# Patient Record
Sex: FEMALE | Race: WHITE | NOT HISPANIC OR LATINO | Employment: OTHER | URBAN - METROPOLITAN AREA
[De-identification: names, ages, dates, MRNs, and addresses within clinical notes are randomized per-mention and may not be internally consistent; named-entity substitution may affect disease eponyms.]

---

## 2018-09-05 ENCOUNTER — HOSPITAL ENCOUNTER (EMERGENCY)
Facility: CLINIC | Age: 73
Discharge: HOME | End: 2018-09-05
Attending: EMERGENCY MEDICINE | Admitting: EMERGENCY MEDICINE
Payer: COMMERCIAL

## 2018-09-05 ENCOUNTER — APPOINTMENT (OUTPATIENT)
Dept: CT IMAGING | Facility: CLINIC | Age: 73
End: 2018-09-05
Attending: PHYSICIAN ASSISTANT
Payer: COMMERCIAL

## 2018-09-05 VITALS
OXYGEN SATURATION: 99 % | DIASTOLIC BLOOD PRESSURE: 62 MMHG | BODY MASS INDEX: 24.79 KG/M2 | HEART RATE: 68 BPM | TEMPERATURE: 98.4 F | RESPIRATION RATE: 20 BRPM | SYSTOLIC BLOOD PRESSURE: 152 MMHG | HEIGHT: 62 IN | WEIGHT: 134.7 LBS

## 2018-09-05 DIAGNOSIS — I71.21 ASCENDING AORTIC ANEURYSM (H): ICD-10-CM

## 2018-09-05 DIAGNOSIS — R16.0 LIVER MASS, RIGHT LOBE: ICD-10-CM

## 2018-09-05 DIAGNOSIS — M54.9 ACUTE UPPER BACK PAIN: ICD-10-CM

## 2018-09-05 LAB
ANION GAP SERPL CALCULATED.3IONS-SCNC: 10 MMOL/L (ref 3–14)
BASOPHILS # BLD AUTO: 0 10E9/L (ref 0–0.2)
BASOPHILS NFR BLD AUTO: 0.2 %
BUN SERPL-MCNC: 15 MG/DL (ref 7–30)
CALCIUM SERPL-MCNC: 8.5 MG/DL (ref 8.5–10.1)
CHLORIDE SERPL-SCNC: 103 MMOL/L (ref 94–109)
CO2 SERPL-SCNC: 26 MMOL/L (ref 20–32)
CREAT SERPL-MCNC: 0.72 MG/DL (ref 0.52–1.04)
DIFFERENTIAL METHOD BLD: ABNORMAL
EOSINOPHIL # BLD AUTO: 0.1 10E9/L (ref 0–0.7)
EOSINOPHIL NFR BLD AUTO: 0.4 %
ERYTHROCYTE [DISTWIDTH] IN BLOOD BY AUTOMATED COUNT: 14.7 % (ref 10–15)
GFR SERPL CREATININE-BSD FRML MDRD: 79 ML/MIN/1.7M2
GLUCOSE SERPL-MCNC: 122 MG/DL (ref 70–99)
HCT VFR BLD AUTO: 39 % (ref 35–47)
HGB BLD-MCNC: 12.4 G/DL (ref 11.7–15.7)
IMM GRANULOCYTES # BLD: 0 10E9/L (ref 0–0.4)
IMM GRANULOCYTES NFR BLD: 0.2 %
INTERPRETATION ECG - MUSE: NORMAL
LYMPHOCYTES # BLD AUTO: 1.3 10E9/L (ref 0.8–5.3)
LYMPHOCYTES NFR BLD AUTO: 11.3 %
MCH RBC QN AUTO: 26.8 PG (ref 26.5–33)
MCHC RBC AUTO-ENTMCNC: 31.8 G/DL (ref 31.5–36.5)
MCV RBC AUTO: 84 FL (ref 78–100)
MONOCYTES # BLD AUTO: 0.6 10E9/L (ref 0–1.3)
MONOCYTES NFR BLD AUTO: 5.2 %
NEUTROPHILS # BLD AUTO: 9.3 10E9/L (ref 1.6–8.3)
NEUTROPHILS NFR BLD AUTO: 82.7 %
NRBC # BLD AUTO: 0 10*3/UL
NRBC BLD AUTO-RTO: 0 /100
PLATELET # BLD AUTO: 203 10E9/L (ref 150–450)
POTASSIUM SERPL-SCNC: 3.9 MMOL/L (ref 3.4–5.3)
RBC # BLD AUTO: 4.62 10E12/L (ref 3.8–5.2)
SODIUM SERPL-SCNC: 139 MMOL/L (ref 133–144)
TROPONIN I SERPL-MCNC: <0.015 UG/L (ref 0–0.04)
WBC # BLD AUTO: 11.3 10E9/L (ref 4–11)

## 2018-09-05 PROCEDURE — 93005 ELECTROCARDIOGRAM TRACING: CPT

## 2018-09-05 PROCEDURE — 96374 THER/PROPH/DIAG INJ IV PUSH: CPT | Mod: 59

## 2018-09-05 PROCEDURE — 71260 CT THORAX DX C+: CPT

## 2018-09-05 PROCEDURE — 84484 ASSAY OF TROPONIN QUANT: CPT | Performed by: PHYSICIAN ASSISTANT

## 2018-09-05 PROCEDURE — 85025 COMPLETE CBC W/AUTO DIFF WBC: CPT | Performed by: PHYSICIAN ASSISTANT

## 2018-09-05 PROCEDURE — 99285 EMERGENCY DEPT VISIT HI MDM: CPT | Mod: 25

## 2018-09-05 PROCEDURE — 25000128 H RX IP 250 OP 636: Performed by: EMERGENCY MEDICINE

## 2018-09-05 PROCEDURE — 25000125 ZZHC RX 250: Performed by: EMERGENCY MEDICINE

## 2018-09-05 PROCEDURE — 80048 BASIC METABOLIC PNL TOTAL CA: CPT | Performed by: PHYSICIAN ASSISTANT

## 2018-09-05 PROCEDURE — 25000128 H RX IP 250 OP 636: Performed by: PHYSICIAN ASSISTANT

## 2018-09-05 RX ORDER — IOPAMIDOL 755 MG/ML
80 INJECTION, SOLUTION INTRAVASCULAR ONCE
Status: COMPLETED | OUTPATIENT
Start: 2018-09-05 | End: 2018-09-05

## 2018-09-05 RX ORDER — HYDROCODONE BITARTRATE AND ACETAMINOPHEN 5; 325 MG/1; MG/1
1 TABLET ORAL EVERY 6 HOURS PRN
Qty: 8 TABLET | Refills: 0 | Status: SHIPPED | OUTPATIENT
Start: 2018-09-05

## 2018-09-05 RX ORDER — HYDROMORPHONE HYDROCHLORIDE 1 MG/ML
0.5 INJECTION, SOLUTION INTRAMUSCULAR; INTRAVENOUS; SUBCUTANEOUS ONCE
Status: COMPLETED | OUTPATIENT
Start: 2018-09-05 | End: 2018-09-05

## 2018-09-05 RX ORDER — HYDROMORPHONE HYDROCHLORIDE 1 MG/ML
0.5 INJECTION, SOLUTION INTRAMUSCULAR; INTRAVENOUS; SUBCUTANEOUS
Status: DISCONTINUED | OUTPATIENT
Start: 2018-09-05 | End: 2018-09-05

## 2018-09-05 RX ADMIN — SODIUM CHLORIDE, PRESERVATIVE FREE 70 ML: 5 INJECTION INTRAVENOUS at 21:08

## 2018-09-05 RX ADMIN — Medication 0.5 MG: at 20:20

## 2018-09-05 RX ADMIN — IOPAMIDOL 80 ML: 755 INJECTION, SOLUTION INTRAVENOUS at 21:08

## 2018-09-05 ASSESSMENT — ENCOUNTER SYMPTOMS
BACK PAIN: 1
COUGH: 1
NAUSEA: 0
VOMITING: 0
SHORTNESS OF BREATH: 1
LIGHT-HEADEDNESS: 1
DIAPHORESIS: 1

## 2018-09-05 NOTE — ED AVS SNAPSHOT
Emergency Department    [address] [facility] [address]    Phone:  [phone]    Fax:  [phone]                                       Jessica Rawls   MRN: [id number]    Department:   Emergency Department   Date of Visit:  9/5/2018           After Visit Summary Signature Page     I have received my discharge instructions, and my questions have been answered. I have discussed any challenges I see with this plan with the nurse or doctor.    ..........................................................................................................................................  Patient/Patient Representative Signature      ..........................................................................................................................................  Patient Representative Print Name and Relationship to Patient    ..................................................               ................................................  Date                                            Time    ..........................................................................................................................................  Reviewed by Signature/Title    ...................................................              ..............................................  Date                                                            Time          22EPIC Rev 08/18

## 2018-09-05 NOTE — ED AVS SNAPSHOT
Emergency Department    [address] [facility] [address]    Phone:  [phone]    Fax:  [phone]                                       Jessica Rawls   MRN: [id number]    Department:   Emergency Department   Date of Visit:  9/5/2018           Patient Information     Date Of Birth          1945        Your diagnoses for this visit were:     Acute upper back pain     Liver mass, right lobe     Ascending aortic aneurysm (H)        You were seen by Chayo Navarro MD.      Follow-up Information     Follow up with  Emergency Department.    Specialty:  EMERGENCY MEDICINE    Why:  As needed, If symptoms worsen    Contact information:    [address] [facility] [address] [phone]        Follow up with Marilin Hobson MD In 1 day.    Specialty:  Internal Medicine    Contact information:    ETHAN HERRING  [address] [facility] [address] [facility] [address]  [facility] [address] [phone]          Discharge Instructions       Follow-up with your primary care provider regarding the incidental mass seen in your liver as further imaging may be required to exclude the possibility of cancerous mass.  Discharge Instructions  Back Pain  You were seen today for back pain. Back pain can have many causes, but most will get better without surgery or other specific treatment. Sometimes there is a herniated ( slipped ) disc. We do not usually do MRI scans to look for these right away, since most herniated discs will get better on their own with time.  Today, we did not find any evidence that your back pain was caused by a serious condition. However, sometimes symptoms develop over time and cannot be found during an emergency visit, so it is very important that you follow up with your primary provider.  Generally, every Emergency Department visit should have a follow-up clinic visit with either a primary or a specialty clinic/provider. Please follow-up as instructed by your emergency provider  today.    Return to the Emergency Department if:    You develop a fever with your back pain.     You have weakness or change in sensation in one or both legs.    You lose control of your bowels or bladder, or cannot empty your bladder (cannot pee).    Your pain gets much worse.     Follow-up with your provider:    Unless your pain has completely gone away, please make an appointment with your provider within one week. Most of the routine care for back pain is available in a clinic and not the Emergency Department. You may need further management of your back pain, such as more pain medication, imaging such as an X-ray or MRI, or physical therapy.    What can I do to help myself?    Remain Active -- People are often afraid that they will hurt their back further or delay recovery by remaining active, but this is one of the best things you can do for your back. In fact, staying in bed for a long time to rest is not recommended. Studies have shown that people with low back pain recover faster when they remain active. Movement helps to bring blood flow to the muscles and relieve muscle spasms as well as preventing loss of muscle strength.    Heat -- Using a heating pad can help with low back pain during the first few weeks. Do not sleep with a heating pad, as you can be burned.     Pain medications - You may take a pain medication such as Tylenol  (acetaminophen), Advil , Motrin  (ibuprofen) or Aleve  (naproxen).  If you were given a prescription for medicine here today, be sure to read all of the information (including the package insert) that comes with your prescription.  This will include important information about the medicine, its side effects, and any warnings that you need to know about.  The pharmacist who fills the prescription can provide more information and answer questions you may have about the medicine.  If you have questions or concerns that the pharmacist cannot address, please call or return to the  Emergency Department.   Remember that you can always come back to the Emergency Department if you are not able to see your regular provider in the amount of time listed above, if you get any new symptoms, or if there is anything that worries you.    24 Hour Appointment Hotline       To make an appointment at any [facility], call [phone] ([phone]). If you don't have a family doctor or clinic, we will help you find one. [facility] are conveniently located to serve the needs of you and your family.             Review of your medicines      CONTINUE these medicines which may have CHANGED, or have new prescriptions. If we are uncertain of the size of tablets/capsules you have at home, strength may be listed as something that might have changed.        Dose / Directions Last dose taken    HYDROcodone-acetaminophen 5-325 MG per tablet   Commonly known as:  NORCO   Dose:  1 tablet   What changed:    - how much to take  - when to take this  - reasons to take this   Quantity:  8 tablet        Take 1 tablet by mouth every 6 hours as needed for severe pain   Refills:  0          Our records show that you are taking the medicines listed below. If these are incorrect, please call your family doctor or clinic.        Dose / Directions Last dose taken    AMLODIPINE BESYLATE PO   Dose:  2.5 mg        Take 2.5 mg by mouth daily.   Refills:  0        butalbital-APAP-caffeine-codeine -88-30 MG per capsule   Commonly known as:  FIORICET WITH codeine   Dose:  1 capsule        Take 1 capsule by mouth every 4 hours as needed.   Refills:  0        LORAZEPAM PO   Dose:  0.5 mg        Take 0.5 mg by mouth as needed.   Refills:  0        TEMAZEPAM PO   Dose:  15 mg        Take 15 mg by mouth nightly as needed.   Refills:  0                Information about OPIOIDS     PRESCRIPTION OPIOIDS: WHAT YOU NEED TO KNOW   We gave you an opioid (narcotic) pain medicine. It is important to manage your pain, but opioids are  not always the best choice. You should first try all the other options your care team gave you. Take this medicine for as short a time (and as few doses) as possible.    Some activities can increase your pain, such as bandage changes or therapy sessions. It may help to take your pain medicine 30 to 60 minutes before these activities. Reduce your stress by getting enough sleep, working on hobbies you enjoy and practicing relaxation or meditation. Talk to your care team about ways to manage your pain beyond prescription opioids.    These medicines have risks:    DO NOT drive when on new or higher doses of pain medicine. These medicines can affect your alertness and reaction times, and you could be arrested for driving under the influence (DUI). If you need to use opioids long-term, talk to your care team about driving.    DO NOT operate heavy machinery    DO NOT do any other dangerous activities while taking these medicines.    DO NOT drink any alcohol while taking these medicines.     If the opioid prescribed includes acetaminophen, DO NOT take with any other medicines that contain acetaminophen. Read all labels carefully. Look for the word  acetaminophen  or  Tylenol.  Ask your pharmacist if you have questions or are unsure.    You can get addicted to pain medicines, especially if you have a history of addiction (chemical, alcohol or substance dependence). Talk to your care team about ways to reduce this risk.    All opioids tend to cause constipation. Drink plenty of water and eat foods that have a lot of fiber, such as fruits, vegetables, prune juice, apple juice and high-fiber cereal. Take a laxative (Miralax, milk of magnesia, Colace, Senna) if you don t move your bowels at least every other day. Other side effects include upset stomach, sleepiness, dizziness, throwing up, tolerance (needing more of the medicine to have the same effect), physical dependence and slowed breathing.    Store your pills in a secure  place, locked if possible. We will not replace any lost or stolen medicine. If you don t finish your medicine, please throw away (dispose) as directed by your pharmacist. The [place] [employer] has more information about safe disposal: https://www.pca.state.[place]/living-green/managing-unwanted-medications        Prescriptions were sent or printed at these locations (1 Prescription)                   Other Prescriptions                Printed at Department/Unit printer (1 of 1)         HYDROcodone-acetaminophen (NORCO) 5-325 MG per tablet                Procedures and tests performed during your visit     Basic metabolic panel    CBC with platelets differential    CT Aortic Survey w Contrast    EKG 12 lead    Troponin I      Orders Needing Specimen Collection     None      Pending Results     No orders found from 9/3/2018 to 9/6/2018.            Pending Culture Results     No orders found from 9/3/2018 to 9/6/2018.            Pending Results Instructions     If you had any lab results that were not finalized at the time of your Discharge, you can call the ED Lab Result RN at [phone]. You will be contacted by this team for any positive Lab results or changes in treatment. The nurses are available 7 days a week from 10A to 6:30P.  You can leave a message 24 hours per day and they will return your call.        Test Results From Your Hospital Stay        9/5/2018  8:30 PM      Component Results     Component Value Ref Range & Units Status    WBC 11.3 (H) 4.0 - 11.0 10e9/L Final    RBC Count 4.62 3.8 - 5.2 10e12/L Final    Hemoglobin 12.4 11.7 - 15.7 g/dL Final    Hematocrit 39.0 35.0 - 47.0 % Final    MCV 84 78 - 100 fl Final    MCH 26.8 26.5 - 33.0 pg Final    MCHC 31.8 31.5 - 36.5 g/dL Final    RDW 14.7 10.0 - 15.0 % Final    Platelet Count 203 150 - 450 10e9/L Final    Diff Method Automated Method  Final    % Neutrophils 82.7 % Final    % Lymphocytes 11.3 % Final    % Monocytes 5.2 % Final    %  Eosinophils 0.4 % Final    % Basophils 0.2 % Final    % Immature Granulocytes 0.2 % Final    Nucleated RBCs 0 0 /100 Final    Absolute Neutrophil 9.3 (H) 1.6 - 8.3 10e9/L Final    Absolute Lymphocytes 1.3 0.8 - 5.3 10e9/L Final    Absolute Monocytes 0.6 0.0 - 1.3 10e9/L Final    Absolute Eosinophils 0.1 0.0 - 0.7 10e9/L Final    Absolute Basophils 0.0 0.0 - 0.2 10e9/L Final    Abs Immature Granulocytes 0.0 0 - 0.4 10e9/L Final    Absolute Nucleated RBC 0.0  Final         9/5/2018  8:43 PM      Component Results     Component Value Ref Range & Units Status    Sodium 139 133 - 144 mmol/L Final    Potassium 3.9 3.4 - 5.3 mmol/L Final    Chloride 103 94 - 109 mmol/L Final    Carbon Dioxide 26 20 - 32 mmol/L Final    Anion Gap 10 3 - 14 mmol/L Final    Glucose 122 (H) 70 - 99 mg/dL Final    Urea Nitrogen 15 7 - 30 mg/dL Final    Creatinine 0.72 0.52 - 1.04 mg/dL Final    GFR Estimate 79 >60 mL/min/1.7m2 Final    Non  GFR Calc    GFR Estimate If Black >90 >60 mL/min/1.7m2 Final    African American GFR Calc    Calcium 8.5 8.5 - 10.1 mg/dL Final         9/5/2018  8:48 PM      Component Results     Component Value Ref Range & Units Status    Troponin I ES <0.015 0.000 - 0.045 ug/L Final    The 99th percentile for upper reference range is 0.045 ug/L.  Troponin values   in the range of 0.045 - 0.120 ug/L may be associated with risks of adverse   clinical events.           9/5/2018  9:28 PM      Narrative     CT AORTIC SURVEY WITH CONTRAST   9/5/2018 9:09 PM      HISTORY: Sudden onset thoracic back pain, history of prior ascending  aortic aneurysm repair. Evaluate for pulmonary embolus as well.     TECHNIQUE: CT chest, abdomen and pelvis with intravenous contrast.  Radiation dose for this scan was reduced using automated exposure  control, adjustment of the mA and/or kV according to patient size, or  iterative reconstruction technique. 80mL [id number].       COMPARISON:  None.    FINDINGS:  Chest: There is  aneurysmal dilatation of the proximal aortic arch  measuring 4.6 cm. The distal aortic arch is mildly aneurysmal  measuring 3.6 cm. No aortic dissection. There are postoperative  changes in the mediastinum. The heart is at the upper limits normal in  size. Evaluation of the pulmonary arterial system shows no evidence of  embolus. There is dependent atelectasis bilaterally. Mild fibrosis at  the periphery of the lungs and lung bases. No pneumothorax or pleural  effusion.    Abdomen: There is atherosclerotic disease throughout the aorta and its  branches. No aortic aneurysm or dissection. There is a mass in the  posterior right lobe of the liver laterally measuring 2.6 cm and  demonstrating peripheral hypervascular enhancement. The spleen,  gallbladder, pancreas, adrenal glands and kidneys are normal in  appearance. There is no abdominal or pelvic lymph node enlargement.    Pelvis: There are colonic diverticula without acute diverticulitis. No  bowel obstruction or inflammation. The appendix is normal. The uterus  and adnexa appear normal. No free intraperitoneal gas or fluid.        Impression     IMPRESSION:  1. There is aneurysmal dilatation of the aortic arch. No aortic  dissection.  2. 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  3. 2.6 cm indeterminant right lobe liver lesion.  4. Colonic diverticula without acute diverticulitis. No bowel  obstruction or inflammation.    KYLIE JARA MD                Clinical Quality Measure: Blood Pressure Screening     Your blood pressure was checked while you were in the emergency department today. The last reading we obtained was  BP: 159/60 . Please read the guidelines below about what these numbers mean and what you should do about them.  If your systolic blood pressure (the top number) is less than 120 and your diastolic blood pressure (the bottom number) is less than 80, then your blood pressure is normal. There is nothing more that you need  "to do about it.  If your systolic blood pressure (the top number) is 120-139 or your diastolic blood pressure (the bottom number) is 80-89, your blood pressure may be higher than it should be. You should have your blood pressure rechecked within a year by a primary care provider.  If your systolic blood pressure (the top number) is 140 or greater or your diastolic blood pressure (the bottom number) is 90 or greater, you may have high blood pressure. High blood pressure is treatable, but if left untreated over time it can put you at risk for heart attack, stroke, or kidney failure. You should have your blood pressure rechecked by a primary care provider within the next 4 weeks.  If your provider in the emergency department today gave you specific instructions to follow-up with your doctor or provider even sooner than that, you should follow that instruction and not wait for up to 4 weeks for your follow-up visit.        Thank you for choosing [facility]       Thank you for choosing [facility] for your care. Our goal is always to provide you with excellent care. Hearing back from our patients is one way we can continue to improve our services. Please take a few minutes to complete the written survey that you may receive in the mail after you visit with us. Thank you!        [employer] Information     [employer] lets you send messages to your doctor, view your test results, renew your prescriptions, schedule appointments and more. To sign up, go to [email or web address] . Click on \"Log in\" on the left side of the screen, which will take you to the Welcome page. Then click on \"Sign up Now\" on the right side of the page.     You will be asked to enter the access code listed below, as well as some personal information. Please follow the directions to create your username and password.     Your access code is: [email or web address]  Expires: 2018  9:40 PM     Your access code will  in 90 days. If you need help or a new code, " please call your [facility] clinic or [phone].        Care EveryWhere ID     This is your Care EveryWhere ID. This could be used by other organizations to access your [facility] medical records  [id number]        Equal Access to Services     JOSUE COLÓN : Rowdy Huitron, waaxda luqadaha, qaybta kaalmada radha, torrey bang. So [facility] [phone].    ATENCIÓN: Si habla español, tiene a brady disposición servicios gratuitos de asistencia lingüística. Llame al [phone].    We comply with applicable federal civil rights laws and [place] laws. We do not discriminate on the basis of race, color, national origin, age, disability, sex, sexual orientation, or gender identity.            After Visit Summary       This is your record. Keep this with you and show to your community pharmacist(s) and doctor(s) at your next visit.

## 2018-09-06 NOTE — DISCHARGE INSTRUCTIONS
Follow-up with your primary care provider regarding the incidental mass seen in your liver as further imaging may be required to exclude the possibility of cancerous mass.  Discharge Instructions  Back Pain  You were seen today for back pain. Back pain can have many causes, but most will get better without surgery or other specific treatment. Sometimes there is a herniated ( slipped ) disc. We do not usually do MRI scans to look for these right away, since most herniated discs will get better on their own with time.  Today, we did not find any evidence that your back pain was caused by a serious condition. However, sometimes symptoms develop over time and cannot be found during an emergency visit, so it is very important that you follow up with your primary provider.  Generally, every Emergency Department visit should have a follow-up clinic visit with either a primary or a specialty clinic/provider. Please follow-up as instructed by your emergency provider today.    Return to the Emergency Department if:    You develop a fever with your back pain.     You have weakness or change in sensation in one or both legs.    You lose control of your bowels or bladder, or cannot empty your bladder (cannot pee).    Your pain gets much worse.     Follow-up with your provider:    Unless your pain has completely gone away, please make an appointment with your provider within one week. Most of the routine care for back pain is available in a clinic and not the Emergency Department. You may need further management of your back pain, such as more pain medication, imaging such as an X-ray or MRI, or physical therapy.    What can I do to help myself?    Remain Active -- People are often afraid that they will hurt their back further or delay recovery by remaining active, but this is one of the best things you can do for your back. In fact, staying in bed for a long time to rest is not recommended. Studies have shown that people with  low back pain recover faster when they remain active. Movement helps to bring blood flow to the muscles and relieve muscle spasms as well as preventing loss of muscle strength.    Heat -- Using a heating pad can help with low back pain during the first few weeks. Do not sleep with a heating pad, as you can be burned.     Pain medications - You may take a pain medication such as Tylenol  (acetaminophen), Advil , Motrin  (ibuprofen) or Aleve  (naproxen).  If you were given a prescription for medicine here today, be sure to read all of the information (including the package insert) that comes with your prescription.  This will include important information about the medicine, its side effects, and any warnings that you need to know about.  The pharmacist who fills the prescription can provide more information and answer questions you may have about the medicine.  If you have questions or concerns that the pharmacist cannot address, please call or return to the Emergency Department.   Remember that you can always come back to the Emergency Department if you are not able to see your regular provider in the amount of time listed above, if you get any new symptoms, or if there is anything that worries you.

## 2018-09-06 NOTE — ED PROVIDER NOTES
History     Chief Complaint:  Back pain    HPI   Jessica Rawls is a 73 year old female status-post ascending aortic aneurysm repair 5-6 years ago, with history of hypertension, who presents to the emergency department today with her  (an MD) for evaluation of upper back pain that began suddenly this evening around 17:30. The patient reports being on her feet in the kitchen for about one hour prior to onset of her upper back pain. She localized the pain between her shoulder blades and rated the pain 9/10 in severity. She felt diaphoretic initially, then had to sit down when she felt lightheaded and a little short of breath. Her pain began to improve slightly, but then she felt worse pain in her lower back and still some pain in her upper back. The patient states she has never had a pain like this before and this feels different from when she has had muscle spasms in the past. She did take aspirin at home and then presented to the ED for further evaluation. Additionally, the patient reports she has had a cold and cough of late, but she states this is resolving. The patient denies chest pain or abdominal pain. No nausea/vomiting. She denies leg swelling or pain.  No known history of coronary artery disease, diabetes, hyperlipidemia, tobacco use.    PE/DVT Risk Factors:   Hx of PE/DVT:   -  Hx of clotting disorder:  -  Hx of cancer:    -  Tobacco use:    -  Hormone therapy:   -  Pregnancy:    -  Prolonged immobilization:  -  Recent surgery:   -  Recent travel:    -  Familial Hx of PE/DVT:  -    Allergies:  No Known Drug Allergies      Medications:    Amlodipine   Fioricet   Lorazepam   Temazepam   Metoprolol     Past Medical History:    Hypertension   Lipoma     Past Surgical History:       Carpal tunnel release bilaterally   Lipoma removal   AAA replacement    Family History:    History reviewed. No pertinent family history.      Social History:  The patient was accompanied to the ED by , who is  "bashir MARIA.  Smoking Status: never  Smokeless Tobacco: never  Alcohol Use: no   Marital Status:   [2]     Review of Systems   Constitutional: Positive for diaphoresis.   Respiratory: Positive for cough and shortness of breath.    Cardiovascular: Negative for chest pain and leg swelling.   Gastrointestinal: Negative for nausea and vomiting.   Musculoskeletal: Positive for back pain.   Neurological: Positive for light-headedness.   All other systems reviewed and are negative.    Physical Exam     Patient Vitals for the past 24 hrs:   BP Temp Temp src Pulse Resp SpO2 Height Weight   09/05/18 2150 152/62 - - 68 20 99 % - -   09/05/18 1936 159/60 98.4  F (36.9  C) Oral 69 16 99 % 1.575 m (5' 2\") 61.1 kg (134 lb 11.2 oz)      Physical Exam  General: Alert and cooperative with exam. Normal mentation.  Head:  Scalp is NC/AT  Eyes:  No scleral icterus, PERRL   ENT:  The external nose and ears are normal.   CV:  Regular rate and rhythm    No pathologic murmur, rubs, or gallops.  Resp:  Breath sounds are clear bilaterally.  No crackles, wheezes, rhonchi.    Non-labored, no retractions or accessory muscle use  GI:  Abdomen is soft, no distension, no tenderness. No peritoneal signs.  :  No suprapubic or flank tenderness  MS:  No lower extremity edema or asymmetric calf swelling.    No midline cervical, thoracic, or lumbar tenderness.  No tenderness to palpation over muscles of back or ribs.  Skin:  Warm and dry, No rash or lesions noted.  Neuro: Oriented x 3. No gross motor deficits.      Emergency Department Course     ECG:  ECG taken at 1932, ECG read at 1935  Sinus bradycardia  Septal infarct, age undetermined   Abnormal ECG   Rate 56 bpm. LA interval 184. QRS duration 84. QT/QTc 432/416. P-R-T axes 78,23,79.     Imaging:  Radiology findings were communicated with the patient who voiced understanding of the findings.    CT Aortic Survey w Contrast  1. There is aneurysmal dilatation of the aortic arch. No " aortic  dissection.  2. 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  3. 2.6 cm indeterminant right lobe liver lesion.  4. Colonic diverticula without acute diverticulitis. No bowel  obstruction or inflammation.  KYLIE JARA MD    Laboratory:  Laboratory findings were communicated with the patient who voiced understanding of the findings.    Troponin (Collected 1932): <0.015    CBC: WBC 11.3 (H), HGB 12.4,   BMP: Glucose 122 (H) o/w WNL (Creatinine 0.72)    Interventions:  2020 Dilaudid, 0.5 mg, IV      Emergency Department Course:  Nursing notes and vitals reviewed.  I entered the room.  I performed an exam of the patient as documented above.     EKG obtained in the ED, see results above.      IV was inserted and blood was drawn for laboratory testing, results above.    The patient was sent for CT Scan while in the emergency department, results above.     The patient received the above intervention(s).     the patient was rechecked and updated regarding the results of the laboratory and imaging studies.  Pain was resolved on recheck after pain medication.    I discussed the treatment plan with the patient. They expressed understanding of this plan and consented to discharge. They will be discharged home with instructions for care and follow up. In addition, the patient will return to the emergency department if their symptoms worsen, if new symptoms arise or if there is any concern.  All questions were answered.     Impression & Plan      Medical Decision Making:  Jessica Rawls is a 73 year old female who presents to the emergency department today for evaluation of sudden onset of upper back pain.  Patient history and records reviewed.  On examination, the patient is vitally stable and mildly uncomfortable appearing.  EKG obtained as above which demonstrated no evidence of ischemia or arrhythmia.  Lab work including troponin was negative.  Given concern for aortic dissection in  light of patient's symptoms and prior history of thoracic aortic aneurysm CT of the chest and abdomen with contrast was performed which showed no signs of acute dissection, rupture, pulmonary embolism, pneumonia, pneumothorax, perforated ulcer or other cause of patient's symptoms.    I suspect the patient's symptoms of back pain are most likely the result of a muscle spasms given her negative CT scan here.  I was initially most concerned about the possibility of aortic dissection or rupture AAA given her description of sudden onset back pain of maximal intensity and her prior history of thoracic aneurysm.  Fortunately this was negative, but did reveal 4.6 cm ascending aneurysm which the patient had repair of 5 years prior.  That study was not available for review to compare diameter, but I did recommend the patient follow-up with her primary care provider regarding this result.  Additionally, the CT scan did reveal the presence of a indeterminant liver mass, which I also discussed with the patient and recommended that she follow-up with primary care for additional imaging to exclude possibility of malignancy.  Do not suspect acute coronary syndrome as the patient denies chest pain or shortness of breath, and EKG and initial troponin negative.  She has no abdominal tenderness on exam to suggest an intra-abdominal process.  Patient was given symptomatic treatment as above with complete relief of pain.  The patient was given a prescription for a short course of pain medication, and instructed to follow-up with her primary care provider in the next 1-2 days if symptoms persisting.  Discussed indications to return to the ED if any new or worsening symptoms.        Diagnosis:    ICD-10-CM    1. Acute upper back pain M54.9    2. Liver mass, right lobe R16.0    3. Ascending aortic aneurysm (H) I71.2      Disposition:   The patient was discharged to home.    Scribe Disclosure:  Fabiano KENNY, am serving as a scribe at  7:50 PM on 9/5/2018 to document services personally performed by Meek Garcia PA-C, based on my observations and the provider's statements to me.    EMERGENCY DEPARTMENT       Meek Garcia PA-C  09/05/18 0426

## 2022-07-16 ENCOUNTER — HOSPITAL ENCOUNTER (EMERGENCY)
Facility: CLINIC | Age: 77
Discharge: HOME | End: 2022-07-16
Attending: EMERGENCY MEDICINE | Admitting: EMERGENCY MEDICINE
Payer: COMMERCIAL

## 2022-07-16 ENCOUNTER — APPOINTMENT (OUTPATIENT)
Dept: CT IMAGING | Facility: CLINIC | Age: 77
End: 2022-07-16
Attending: EMERGENCY MEDICINE
Payer: COMMERCIAL

## 2022-07-16 VITALS
WEIGHT: 128 LBS | HEART RATE: 61 BPM | SYSTOLIC BLOOD PRESSURE: 128 MMHG | RESPIRATION RATE: 7 BRPM | DIASTOLIC BLOOD PRESSURE: 71 MMHG | TEMPERATURE: 98.1 F | HEIGHT: 61 IN | BODY MASS INDEX: 24.17 KG/M2 | OXYGEN SATURATION: 96 %

## 2022-07-16 DIAGNOSIS — R07.9 CHEST PAIN, UNSPECIFIED TYPE: ICD-10-CM

## 2022-07-16 LAB
ALBUMIN SERPL-MCNC: 3.7 G/DL (ref 3.4–5)
ALP SERPL-CCNC: 74 U/L (ref 40–150)
ALT SERPL W P-5'-P-CCNC: 19 U/L (ref 0–50)
ANION GAP SERPL CALCULATED.3IONS-SCNC: 6 MMOL/L (ref 3–14)
AST SERPL W P-5'-P-CCNC: 17 U/L (ref 0–45)
ATRIAL RATE - MUSE: 58 BPM
BASOPHILS # BLD AUTO: 0 10E3/UL (ref 0–0.2)
BASOPHILS NFR BLD AUTO: 0 %
BILIRUB SERPL-MCNC: 0.3 MG/DL (ref 0.2–1.3)
BUN SERPL-MCNC: 23 MG/DL (ref 7–30)
CALCIUM SERPL-MCNC: 9.3 MG/DL (ref 8.5–10.1)
CHLORIDE BLD-SCNC: 105 MMOL/L (ref 94–109)
CO2 SERPL-SCNC: 27 MMOL/L (ref 20–32)
CREAT BLD-MCNC: 0.8 MG/DL (ref 0.5–1)
CREAT SERPL-MCNC: 0.71 MG/DL (ref 0.52–1.04)
D DIMER PPP FEU-MCNC: 1.02 UG/ML FEU (ref 0–0.5)
DIASTOLIC BLOOD PRESSURE - MUSE: NORMAL MMHG
EOSINOPHIL # BLD AUTO: 0.1 10E3/UL (ref 0–0.7)
EOSINOPHIL NFR BLD AUTO: 2 %
ERYTHROCYTE [DISTWIDTH] IN BLOOD BY AUTOMATED COUNT: 14.4 % (ref 10–15)
GFR SERPL CREATININE-BSD FRML MDRD: 86 ML/MIN/1.73M2
GFR SERPL CREATININE-BSD FRML MDRD: >60 ML/MIN/1.73M2
GLUCOSE BLD-MCNC: 151 MG/DL (ref 70–99)
HCT VFR BLD AUTO: 42.7 % (ref 35–47)
HGB BLD-MCNC: 13 G/DL (ref 11.7–15.7)
HOLD SPECIMEN: NORMAL
IMM GRANULOCYTES # BLD: 0 10E3/UL
IMM GRANULOCYTES NFR BLD: 0 %
INR PPP: 1 (ref 0.85–1.15)
INTERPRETATION ECG - MUSE: NORMAL
LYMPHOCYTES # BLD AUTO: 2 10E3/UL (ref 0.8–5.3)
LYMPHOCYTES NFR BLD AUTO: 24 %
MCH RBC QN AUTO: 26.5 PG (ref 26.5–33)
MCHC RBC AUTO-ENTMCNC: 30.4 G/DL (ref 31.5–36.5)
MCV RBC AUTO: 87 FL (ref 78–100)
MONOCYTES # BLD AUTO: 0.6 10E3/UL (ref 0–1.3)
MONOCYTES NFR BLD AUTO: 7 %
NEUTROPHILS # BLD AUTO: 5.6 10E3/UL (ref 1.6–8.3)
NEUTROPHILS NFR BLD AUTO: 67 %
NRBC # BLD AUTO: 0 10E3/UL
NRBC BLD AUTO-RTO: 0 /100
P AXIS - MUSE: 43 DEGREES
PLATELET # BLD AUTO: 209 10E3/UL (ref 150–450)
POTASSIUM BLD-SCNC: 4.2 MMOL/L (ref 3.4–5.3)
PR INTERVAL - MUSE: 172 MS
PROT SERPL-MCNC: 7.5 G/DL (ref 6.8–8.8)
QRS DURATION - MUSE: 84 MS
QT - MUSE: 414 MS
QTC - MUSE: 406 MS
R AXIS - MUSE: 2 DEGREES
RBC # BLD AUTO: 4.9 10E6/UL (ref 3.8–5.2)
SODIUM SERPL-SCNC: 138 MMOL/L (ref 133–144)
SYSTOLIC BLOOD PRESSURE - MUSE: NORMAL MMHG
T AXIS - MUSE: 50 DEGREES
TROPONIN I SERPL HS-MCNC: 5 NG/L
TROPONIN I SERPL HS-MCNC: 7 NG/L
VENTRICULAR RATE- MUSE: 58 BPM
WBC # BLD AUTO: 8.3 10E3/UL (ref 4–11)

## 2022-07-16 PROCEDURE — 85025 COMPLETE CBC W/AUTO DIFF WBC: CPT | Performed by: EMERGENCY MEDICINE

## 2022-07-16 PROCEDURE — 84484 ASSAY OF TROPONIN QUANT: CPT | Performed by: EMERGENCY MEDICINE

## 2022-07-16 PROCEDURE — 250N000013 HC RX MED GY IP 250 OP 250 PS 637: Performed by: EMERGENCY MEDICINE

## 2022-07-16 PROCEDURE — 36415 COLL VENOUS BLD VENIPUNCTURE: CPT | Performed by: EMERGENCY MEDICINE

## 2022-07-16 PROCEDURE — 250N000009 HC RX 250: Performed by: EMERGENCY MEDICINE

## 2022-07-16 PROCEDURE — 85610 PROTHROMBIN TIME: CPT | Performed by: EMERGENCY MEDICINE

## 2022-07-16 PROCEDURE — 74177 CT ABD & PELVIS W/CONTRAST: CPT

## 2022-07-16 PROCEDURE — 93005 ELECTROCARDIOGRAM TRACING: CPT

## 2022-07-16 PROCEDURE — 82565 ASSAY OF CREATININE: CPT

## 2022-07-16 PROCEDURE — 250N000011 HC RX IP 250 OP 636: Performed by: EMERGENCY MEDICINE

## 2022-07-16 PROCEDURE — 80053 COMPREHEN METABOLIC PANEL: CPT | Performed by: EMERGENCY MEDICINE

## 2022-07-16 PROCEDURE — 85379 FIBRIN DEGRADATION QUANT: CPT | Performed by: EMERGENCY MEDICINE

## 2022-07-16 PROCEDURE — 82040 ASSAY OF SERUM ALBUMIN: CPT | Performed by: EMERGENCY MEDICINE

## 2022-07-16 PROCEDURE — 99285 EMERGENCY DEPT VISIT HI MDM: CPT | Mod: 25

## 2022-07-16 RX ORDER — NITROGLYCERIN 0.4 MG/1
0.4 TABLET SUBLINGUAL EVERY 5 MIN PRN
Qty: 25 TABLET | Refills: 0 | Status: SHIPPED | OUTPATIENT
Start: 2022-07-16

## 2022-07-16 RX ORDER — ACETAMINOPHEN 325 MG/1
650 TABLET ORAL ONCE
Status: COMPLETED | OUTPATIENT
Start: 2022-07-16 | End: 2022-07-16

## 2022-07-16 RX ORDER — IOPAMIDOL 755 MG/ML
80 INJECTION, SOLUTION INTRAVASCULAR ONCE
Status: COMPLETED | OUTPATIENT
Start: 2022-07-16 | End: 2022-07-16

## 2022-07-16 RX ORDER — NITROGLYCERIN 0.4 MG/1
0.4 TABLET SUBLINGUAL EVERY 5 MIN PRN
Status: DISCONTINUED | OUTPATIENT
Start: 2022-07-16 | End: 2022-07-17 | Stop reason: HOSPADM

## 2022-07-16 RX ADMIN — NITROGLYCERIN 0.4 MG: 0.4 TABLET SUBLINGUAL at 22:11

## 2022-07-16 RX ADMIN — IOPAMIDOL 80 ML: 755 INJECTION, SOLUTION INTRAVENOUS at 20:37

## 2022-07-16 RX ADMIN — SODIUM CHLORIDE 80 ML: 900 INJECTION INTRAVENOUS at 20:37

## 2022-07-16 RX ADMIN — ACETAMINOPHEN 650 MG: 325 TABLET ORAL at 22:11

## 2022-07-17 NOTE — ED TRIAGE NOTES
Upper back pain under shoulder blades and chest pain since 6 pm. Hx of aortic dissection and was told closely monitor for chest pain.     Triage Assessment     Row Name 07/16/22 1930       Triage Assessment (Adult)    Airway WDL WDL       Respiratory WDL    Respiratory WDL WDL          Skin Circulation/Temperature WDL    Skin Circulation/Temperature WDL WDL       Cardiac WDL    Cardiac WDL chest pain;X       Peripheral/Neurovascular WDL    Peripheral Neurovascular WDL WDL       Cognitive/Neuro/Behavioral WDL    Cognitive/Neuro/Behavioral WDL WDL

## 2023-02-27 NOTE — ED PROVIDER NOTES
Care Due:                  Date            Visit Type   Department     Provider  --------------------------------------------------------------------------------                                ESTABLISHED                              PATIENT -    [facility]  Last Visit: 10-      [facility]      MEDICINE       Anneliese Estevez  Next Visit: None Scheduled  None         None Found                                                            Last  Test          Frequency    Reason                     Performed    Due Date  --------------------------------------------------------------------------------    Lipid Panel.  12 months..  cholestyramine,..........  02- 02-    [facility] Embedded Care Gaps. Reference number: [id number]. 2/27/2023   6:02:49 AM CST   Visit Date: 07/16/2022    CHIEF COMPLAINT:  Chest pain.    HISTORY OF PRESENT ILLNESS:  This is a 77-year-old woman who presents to the Emergency Department with chest pain.  She was eating when she first noted pain in her back over the upper spine between her scapula.  She then noticed a little pain in the front as well.  She did not have short of breath, though when she would take a breath, she would notice it a little bit more.  No sweats, no nausea, no vomiting.  She has no history of coronary disease, but has had an ascending aortic aneurysm repaired in the past and was told to watch out for some pain.  She denies fevers or chills or productive cough.  She has no history of coronary disease or PE.  No leg swelling or recent travel.    PAST MEDICAL HISTORY:  As noted above.  She does have high blood pressure, and takes amlodipine and Tenormin.    FAMILY AND SOCIAL HISTORY:  .  Does not smoke or drink.    REVIEW OF SYSTEMS:  As noted with all other systems being negative.      ALLERGIES:  LISINOPRIL, CAUSING COUGH.    PHYSICAL EXAMINATION:    GENERAL:  Reveals an elderly white female, sitting.  No respiratory distress.  VITAL SIGNS:  Blood pressure 165/73.  Temperature 98.  Pulse 54.  Respirations 16.  Pulse ox 97% on room air.  HEENT:  Pupils equal, reactive.  Extraocular movements intact.  Nares and oropharynx are clear.  NECK:  Neck veins flat.  LUNGS:  Clear.  HEART:  Heart sounds are regular.  No murmurs appreciated.  Sternotomy scar is present.  ABDOMEN:  Soft, no tenderness or masses.  Peripheral pulses 2+ radial and bilateral lower extremities.  MUSCULOSKELETAL:  No swelling or tenderness.  SKIN:  No rash.  NEUROLOGIC:  Awake, alert, appropriate.  Fluent speech.  Normal motor sensation and coordination.  PSYCHIATRIC:  Normal affect.    EMERGENCY DEPARTMENT COURSE:  The patient was placed on EKG, blood pressure, and oximetry monitoring.  EKG revealed a sinus rhythm with possible left atrial  abnormality and nonspecific ST-T changes, primarily T-wave inversion extends to lead V2 and V3.      Lab work:  D-dimer minimally elevated at 1.02.  INR 1.  White count 8.3, hemoglobin 13, platelet count is 209.  Glucose 151.  Initial troponin was normal and second troponin was 7, again Normal.      The patient had taken an aspirin on her own.  We did give her Tylenol and we gave her nitroglycerin with one nitroglycerin spray her pain went completely away.  With ekg abnormality and pain relief with ntg, I recommended she be admitted for further chest pain evaluation.  Patient understood but said she would not be admitted,, she agreed to take asa daily, ntg if pain returned, and have an outpatient stress test this week   If pain returns return to ED    Impression:  Chest pain    Rell Horton MD        D: 2022   T: 2022   MT: [id number]    Name:     HAYLIE SOSA  MRN:      [id number]        Account:    [id number]   :      1945           Visit Date: 2022     Document: [id number]

## 2024-12-16 ENCOUNTER — TELEPHONE (OUTPATIENT)
Dept: CARDIOLOGY | Facility: CLINIC | Age: 79
End: 2024-12-16

## 2024-12-16 NOTE — TELEPHONE ENCOUNTER
Patient called to see if she knew she had an appt today and she stated that no one notified her of this appointment.  RN apologized and rescheduled her for 12/23/24 at 2:30 pm.  Directions to the clinic provided.

## 2024-12-23 ENCOUNTER — TELEPHONE (OUTPATIENT)
Dept: CARDIOLOGY | Facility: CLINIC | Age: 79
End: 2024-12-23

## 2024-12-23 NOTE — TELEPHONE ENCOUNTER
LVM for pt regarding appt today, needing to r/s due to provider being in procedure/surgery.   
pt overal low risk for suicide attempt, no si/hi, no hx attempt, supportive family, risk factors include agitation

## 2025-01-06 ENCOUNTER — DOCUMENTATION ONLY (OUTPATIENT)
Dept: CARDIOLOGY | Facility: CLINIC | Age: 80
End: 2025-01-06

## 2025-01-06 ENCOUNTER — OFFICE VISIT (OUTPATIENT)
Dept: CARDIOLOGY | Facility: CLINIC | Age: 80
End: 2025-01-06
Payer: COMMERCIAL

## 2025-01-06 VITALS
HEIGHT: 61 IN | SYSTOLIC BLOOD PRESSURE: 134 MMHG | DIASTOLIC BLOOD PRESSURE: 78 MMHG | HEART RATE: 83 BPM | BODY MASS INDEX: 24.17 KG/M2 | WEIGHT: 128 LBS | OXYGEN SATURATION: 96 %

## 2025-01-06 DIAGNOSIS — I71.23 ANEURYSM OF DESCENDING THORACIC AORTA WITHOUT RUPTURE: ICD-10-CM

## 2025-01-06 DIAGNOSIS — I71.21 ANEURYSM OF ASCENDING AORTA WITHOUT RUPTURE: ICD-10-CM

## 2025-01-06 PROCEDURE — 99204 OFFICE O/P NEW MOD 45 MIN: CPT | Performed by: SURGERY

## 2025-01-06 RX ORDER — AMLODIPINE BESYLATE 5 MG/1
5 TABLET ORAL DAILY
COMMUNITY
Start: 2024-10-28 | End: 2025-01-09

## 2025-01-06 RX ORDER — ACETAMINOPHEN 500 MG
500 TABLET ORAL EVERY 6 HOURS PRN
COMMUNITY

## 2025-01-06 RX ORDER — ESTRADIOL 0.1 MG/G
CREAM VAGINAL DAILY
COMMUNITY
Start: 2024-06-14

## 2025-01-06 NOTE — PROGRESS NOTES
Met with patient and  in consultation with Dr. Randall   Pre-op labs and imaging reviewed and discussed with patient/family.  Pre-op education done.  Will schedule right and left heart cath and pre-op labs once patient decides she is for sure going through with surgery.  Possible re-do sternotomy aortic arch repair with frozen elephant trunk and thoraflex graft in near future.  Contact information reviewed and questions addressed.

## 2025-01-07 ENCOUNTER — HOSPITAL ENCOUNTER (OUTPATIENT)
Facility: CLINIC | Age: 80
End: 2025-01-07
Attending: INTERNAL MEDICINE | Admitting: INTERNAL MEDICINE
Payer: COMMERCIAL

## 2025-01-07 DIAGNOSIS — I71.23 ANEURYSM OF DESCENDING THORACIC AORTA WITHOUT RUPTURE: Primary | ICD-10-CM

## 2025-01-07 DIAGNOSIS — I71.23 ANEURYSM OF DESCENDING THORACIC AORTA WITHOUT RUPTURE: ICD-10-CM

## 2025-01-07 NOTE — PROGRESS NOTES
[facility]  Cardiovascular and Thoracic Surgery Clinic Consultation    Jessica Rawls MRN# [id number]   YOB: 1945 Age: 79 year old        Reason for consult: 2nd surgical opinion for complex redo aortic surgery           Assessment and Plan:   Ms. Rawls is a very pleasant otherwise healthy and quite active 78 yo F who looks younger than her stated age. She underwent an ascending aorta replacement in 2013 and now has significant aneurysmal disease involving the distal ascending aorta, aortic arch, and the proximal descending aorta as well. This complex aneurysmal diease would require a redo operation involving circulatory arrest with an hybrid open/endograft (Thoraflex) to replace her aortic arch with a frozen elephant trunk with likely extension with a TEVAR to exclude the descending thoracic aorta. I do think that despite her age and previous surgery, she would be a surgical candidate, and that would be my recommendation.    I explained to the patient the diagnosis in detail with the CTA findings. I went over the risks and benefits of the operation, as well as the possible complications associated with the surgery. These complications include, but are not strictly limited to, infection, bleeding, stroke, paralysis, postop MI, postop arrhythmias, pulmonary and renal complications. I think she is an overall reasonable surgical candidate, and I quoted an operative mortality risk of 5%. She would like to think about her options and let us know when she decides what she would like to do. If she decides to proceed, we will get a LHC/RHC and also have her see Dr. Rene Crouch, who I will reach out to later today.       It was a pleasure to meet Ms. Rawls today.     Attestation:  Ernie Randall MD           Chief Complaint:   Denies any chest pain or shortness of breath.           History of Present Illness:   This patient is a 79 year old female who presents with distal  ascending aorta, aortic arch, and proximal descending thoracic aortic aneurysm for a second surgical opinion. She underwent an ascending aorta replacement in  by Dr. Cristino Ramos at [facility] with a  32mm Hemashield interposition graft. Her most recent CTA from last August demonstrated a 5.0 cm distal ascending, 4.9 cm arch, and a 6.0 cm proximal descending aortic aneurysms. GUICHO in July was unremarkable. She denies any symptoms. She was seen by Dr. Donnie Yung as well.                 Past Medical History:     Past Medical History:   Diagnosis Date    Headaches     Hypertension              Past Surgical History:     Past Surgical History:   Procedure Laterality Date     SECTION      ENDOSCOPIC RELEASE CARPAL TUNNEL  2012    Procedure: ENDOSCOPIC RELEASE CARPAL TUNNEL;  LEFT ENDOSCOPIC CARPAL TUNNEL RELEASE;  Surgeon: Chitra James MD;  Location: [facility]    ENDOSCOPIC RELEASE CARPAL TUNNEL  2012    Procedure: ENDOSCOPIC RELEASE CARPAL TUNNEL;  RIGHT ENDOSCOPIC CARPAL TUNNEL RELEASE;  Surgeon: Chitra James MD;  Location: [facility]    SOFT TISSUE SURGERY      lipoma removal               Social History:     Social History     Tobacco Use    Smoking status: Never    Smokeless tobacco: Never   Substance Use Topics    Alcohol use: No             Family History:   History reviewed. No pertinent family history.          Immunizations:     Immunization History   Administered Date(s) Administered    COVID-19 12+ (MODERNA) 10/28/2023, 2024    COVID-19 12+ (Pfizer) 2024    COVID-19 Bivalent 12+ (Pfizer) 2022, 05/10/2023    COVID-19 MONOVALENT 12+ (Pfizer) 2021, 2021, 10/08/2021    COVID-19 Monovalent 12+ (Pfizer ) 2022             Allergies:     Allergies   Allergen Reactions    Alendronate Muscle Pain (Myalgia)     Side effect NOT an allergy    Lisinopril Cough     Classic side effect NOT an allergy    Zolpidem Other  (See Comments)     Not effective, NOT an allergy             Medications:     Current Outpatient Medications   Medication Sig Dispense Refill    acetaminophen (TYLENOL) 500 MG tablet Take 500 mg by mouth every 6 hours as needed for mild pain.      amLODIPine (NORVASC) 5 MG tablet Take 5 mg by mouth daily.      AMLODIPINE BESYLATE PO Take 2.5 mg by mouth daily.        Calcium 200 MG TABS Take 3 tablets by mouth daily.      estradiol (ESTRACE) 0.1 MG/GM vaginal cream Place vaginally daily.      LORAZEPAM PO Take 0.5 mg by mouth as needed.        Multiple Vitamins-Minerals (MULTIVITAMIN ADULTS 50+ PO) Take 1 tablet by mouth daily.       No current facility-administered medications for this visit.             Review of Systems:   The 10 point Review of Systems is negative other than noted in the HPI           Physical Exam:   Vitals were reviewed                     General: pleasant, in no acute distress  CV: RRR, normal S1 and S2, no M/G/R  Resp: CTAB  Abd: soft, ND  Ext: no edema   Neuro: no focal deficits             Data:     Lab Results   Component Value Date    WBC 8.3 2022    HGB 13.0 2022    HCT 42.7 2022     2022     2022    POTASSIUM 4.2 2022    CHLORIDE 105 2022    CO2 27 2022    BUN 23 2022    CR 0.8 2022     (H) 2022    DD 1.02 (H) 2022    TROPI <0.015 2018    AST 17 2022    ALT 19 2022    ALKPHOS 74 2022    BILITOTAL 0.3 2022    INR 1.00 2022       TTE 24:  ECHOCARDIOGRAM       HAYLIE SOSA   MRN:    [id number]        Accession#:   [id number]   :    1945 79 years Study Date:   2024 1:04:52 PM   Gender: F                 BP:           0/0 mmHg   Height: 154.94 cm         BSA:          1.55 mÂ    Weight: 57.15 kg          Tech:         SKS                             Referring MD: GO HANCOCK   Site[facility]             [facility]   Reading Location: [facility]    Patient Location: Outpatient.       Procedure: 2D, Color Doppler and Spectral Doppler.     Indication for study: Heart murmur   Cardiac Rhythm: Normal sinus.Study quality: Good.     Final Impressions:    1. Normal left ventricular size, normal wall thickness, normal global systolic function, calculated EF of 61 %.    2. Right ventricular cavity size is normal, global systolic RV function is not well visualized.    3. Trileaflet aortic valve with dilated aortic root (4.3 cm) and evidence of prior ascending aorta repair.    4. Dilated ascending aorta beyond the graft, measuring 5.4 cm.    5. Consider cross-sectional imaging (CTA or MRA) to further characterize thoracic aorta dimensions.     Chamber Sizes and Function   Normal left ventricular size, normal wall thickness, normal global systolic function, calculated EF of 61 %. Left atrial size is normal. Right ventricular cavity size is normal, global systolic RV function is not well visualized. The right atrium is normal. The pulmonary artery is of normal size and origin. The sinus of Valsalva is dilated. The ascending aorta is dilated.     Valves, RV Pressures and Diastolic Function     The aortic valve is trileaflet, no stenosis and trivial regurgitation. The mitral valve is normal in structure, trace mitral regurgitation. Indeterminate pattern of LV diastolic filling. The tricuspid valve is normal in structure. Tricuspid regurgitation is trace regurgitation. The tricuspid regurgitant velocity is 2.1 m/s, the estimated right ventricular systolic pressure is 17 mmHg plus right atrial pressure. The pulmonic valve is not well visualized. Trace pulmonary regurgitation.     Masses, Effusion, Shunts   There is no pericardial effusion. The inferior vena cava is normal sized, respiratory size variation greater than 50%. No left to right shunting was detected by limited color flow Doppler interrogation of the interatrial septum.     MEASUREMENTS AND CALCULATIONS     2-D  Measurements and LV Function:     LVID (d) 4.0 cm Planimetered EF 61 %   LVID (s) 2.5 cm LV FS% (2D)     39 %   IVS (d)  0.8 cm LVOT diameter   2.0 cm   LVPW (d) 0.7 cm HR              68 bpm   Ao Sinus 4.3 cm LA Vol index    30 ml/m2   Asc Ao   5.4 cm RV Max 4C (d)   3.4 cm     Diastology:   Mitral         Tissue Doppler   E Peak 0.4 m/s  e', Septum     0.06 m/s   A Peak 0.6 m/s  e', Lateral    0.08 m/s   E/A    0.8      E/e' Average   6.34   DT     141 msec     Aortic Valve:     Vmax       1.2 m/s  TERRY (V)   2.61 cmÂ    VTI        0.25 m   TERRY (I)   2.57 cmÂ    LVOT V max 1.0 m/s  Max PG    5 mmHg   LVOT VTI   0.20 m   Mean PG   3 mmHg   SV         64 ml    Dim Index 0.82   SV index   41 ml/mÂ  CO        4.4 l/min                       CI        2.8 l/min/mÂ      Mitral Valve:     MVA      5.4 cmÂ    MV P 1/2 41 msec     Tricuspid Valve and estimated PA pressures:   TR Vmax 2.1 m/s TAPSE 1.1 cm   TR maxG 17 mmHg         Electronically signed by Abdiaziz Clement MD on 7/24/2024 4:13:09 PM.           Chest CTA 8/6/24:  CHEST -- Computed Tomography     Impression    1. No acute nonvascular findings in the chest.  2. Please refer to separately dictated report for evaluation of  cardiovascular structures.    Please note that all CT scans at this facility use dose modulation,  iterative reconstruction, and/or weight-based dosing when appropriate to  reduce radiation dose to as low as reasonably achievable.    Dictated by Bassam Pacheco MD @ 8/6/2024 12:41:58 PM  Signed by: Bassam Pacheco MD @8/6/2024 12:41:58 PM  (Electronic Signature)  Narrative    STUDY: CHEST CT ANGIOGRAPHY    Study date: 8/6/2024    Indication: 79 year-old female with history of surgical repair of the  ascending aorta has been referred for evaluation of thoracic and abdominal  aorta morphology.    STUDY PARAMETERS:  Scanner: Siemens Definition Drive  Contrast: 88 ml of Omnipaque 350  Scan protocol: Gated high-pitch for noncontrast phase. Gated  high-pitch  for arterial phase. Gated high-pitch for delayed phase.  Radiation dose length product: 225.6      FINAL IMPRESSIONS:  Intact ascending aorta surgical graft with maximum diameters 33 x 35 mm  Dilated ascending aorta past the surgical graft with maximum diameters 50  x 44 mm, cross-sectional area 17.77 cm2, and area to heigh ratio 11.46  cm2/m.  Dilated aortic arch with maximum diameters 40 x 49 mm.  Dilated descending aorta with layered thrombus. The proximal descending  aorta maximum diameter measures 55 x 60 mm  Moderate calcification of the coronary arteries.  Please see separate radiology report for nonvascular findings.      FINDINGS:    Thoracic aorta: Left-sided arch. Mild atheromatous disease in the  ascending aorta.  Aortic sinus: 37 x 37 x 34 mm maximum [id number].  Ascending aorta: The ascending aorta was surgically repaired (32 mm  straight Hemashield graft) and the maximum diameter measures 33 x 35 mm.  The ascending is dilated past the surgical graft with maximum diameter  measures 50 x 44 mm, cross-sectional area of 17.77 cm2, and area to heigh  ratio 11.46 cm2/m.  Arch: The maximum diameter measures 40 x 49 mm. There is a narrow segment  of the aortic arch just distal to left subclavian artery with maximum  diameters measuring 37 x 31 mm.  Descending thoracic aorta: the proximal descending aorta 55 x 60 mm, with  layered mural thrombus.    Abdominal aorta: maximum diameter of the visualized segment 26 x 26 mm.    Left atrium: Normal contrast opacification.    Aortic valve: tri-leaflet.    Coronary arteries: Moderate calcification noted in the coronary arteries.    Pericardium: No effusion    Great arteries: Normal great artery branching pattern.    Brachiocephalic artery: Patent.    Visualized right subclavian artery: Patent.    Visualized right common carotid artery: Patent.    Visualized left common carotid artery: Patent.    Visualized left subclavian artery: Patent.      FOR  PATIENT: Results are automatically released to your [employer]  [employer] account once available, in compliance with federal regulations.    This means that you may see your results before your provider has had a  chance to review them.  Please allow 2-3 business days for your provider  to comment on the results.      Dion Singh MD  Advanced cardiovascular imaging fellow    Edgar Hutson MD, [facility]  Cardiologist  [facility]  Office [phone]

## 2025-01-09 ENCOUNTER — OFFICE VISIT (OUTPATIENT)
Dept: CARDIOLOGY | Facility: CLINIC | Age: 80
End: 2025-01-09
Payer: COMMERCIAL

## 2025-01-09 ENCOUNTER — TELEPHONE (OUTPATIENT)
Dept: CARDIOLOGY | Facility: CLINIC | Age: 80
End: 2025-01-09

## 2025-01-09 VITALS
WEIGHT: 128.4 LBS | BODY MASS INDEX: 24.24 KG/M2 | SYSTOLIC BLOOD PRESSURE: 148 MMHG | HEART RATE: 77 BPM | DIASTOLIC BLOOD PRESSURE: 76 MMHG | HEIGHT: 61 IN

## 2025-01-09 DIAGNOSIS — I10 PRIMARY HYPERTENSION: Primary | ICD-10-CM

## 2025-01-09 DIAGNOSIS — I10 PRIMARY HYPERTENSION: ICD-10-CM

## 2025-01-09 DIAGNOSIS — I71.23 ANEURYSM OF DESCENDING THORACIC AORTA WITHOUT RUPTURE: ICD-10-CM

## 2025-01-09 RX ORDER — AMLODIPINE BESYLATE 5 MG/1
5 TABLET ORAL 2 TIMES DAILY
Qty: 60 TABLET | Refills: 11 | Status: SHIPPED | OUTPATIENT
Start: 2025-01-09

## 2025-01-09 RX ORDER — AMLODIPINE BESYLATE 5 MG/1
5 TABLET ORAL 2 TIMES DAILY
Qty: 60 TABLET | Refills: 11 | Status: SHIPPED | OUTPATIENT
Start: 2025-01-09 | End: 2025-01-09

## 2025-01-09 RX ORDER — ROSUVASTATIN CALCIUM 5 MG/1
1 TABLET, COATED ORAL AT BEDTIME
COMMUNITY
Start: 2024-06-14

## 2025-01-09 NOTE — PROGRESS NOTES
HPI and Plan:   Jessica Rawls is a 79 year old female who presents with for a second opinion related to possible complex redo aortic surgery.  She is a fairly healthy 79-year-old female who underwent ascending aortic replacement in 2013 due to severe aortic aneurysm.  In routine imaging in 2018, she was noted to have aneurysmal dilatation of the proximal aortic arch around 4.6 cm, there is no comment on the descending aorta although the abdominal aorta is read as no aneurysm or dissection.  Again in 2022 she had repeat chest CT which showed ascending thoracic aorta measuring 4.8 cm, atherosclerosis and intraluminal thrombus at the level of the aortic arch and descending thoracic aorta is read as unchanged.  Lastly repeat chest CT in August 2024, the ascending aortic surgical graft is intact and normal size, there is dilatation of the ascending aorta past the surgical graft now measuring 5.0 cm in diameter the aortic arch is dilated maximal diameter 4.9, descending aorta shows layered thrombus in the proximal descending aorta maximal diameter of 5.5 x 6.0 cm.  She initially saw a cardiothoracic surgeon at [facility] Dr. Daniel Zapien who wanted to look at the images himself from the previous chest CTs before making a recommendation according to his note.  The follow-up recommendation is not documented however the patient states that he preferred waiting 6 months and repeating a chest CT rather than pursuing surgery at this time.  She then saw Dr. Randall, cardiothoracic surgeon at [place] [facility] who recommended redo operation with circulatory arrest and a hybrid open graft replacement of the aortic arch with frozen elephant trunk with likely extension with a TEVAR to exclude the descending aorta.  She is having no active symptoms today.  She is mildly hypertensive today with a blood pressure 148/76  We did perform an electrocardiogram in office which demonstrated normal sinus rhythm without acute ST or T  wave abnormality    Summary    1.  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  She has had progressive aneurysmal disease of the distal ascending aorta, aortic arch and descending aorta.  The maximal diameter in the descending aorta is around 6.0 cm.  She has sought the opinion of 2 cardiothoracic surgeons, 1 recommending conservative therapy with repeat chest CT in 3 to 6 months, the other recommending complex thoracic aortic surgery.  Not only does the size of the maximal diameter of the aorta matter in regards to risk of rupture but also its progression over time.  I understand why the first thoracic surgeon wanted to evaluate the images himself prior to making decision as the dictation of the previous chest CTs, it is not clear exactly how much the distal ascending aorta, aortic arch and descending aorta have expanded over the last 6 years.  Given the complexity of this surgical repair, I would strongly recommend surgery in a facility with low operative risk and significant previous experience with this type of complex and extensive aortic surgery.  I am recommending referral to the [facility] at this time.    2.  Hypertension-aggressive control as needed given her complex aortic disease.  She states her blood pressures have been mildly elevated since dealing with this whole issue.  I would recommend increasing her amlodipine for the time being.  She is currently taking 5 mg in the a.m. and 2 point 5 in the PM.  I would recommend taking 5 mg twice daily and I did change her prescription to reflect this.    Please feel free to contact me with any questions given regards to her care  Today's clinic visit entailed:  Review of external notes as documented elsewhere in note  Review of the result(s) of each unique test - chest CT, Cs from 2018,2022  Prescription drug management  Total tme spent in chart review, history, exam, education, clinical  recommendations and counseling 40 min  Provider  Link to MDM Help Grid     The level of medical decision making during this visit was of moderate complexity.    Orders Placed This Encounter   Procedures    EKG 12-lead complete w/read - Clinics (performed today)     Orders Placed This Encounter   Medications    rosuvastatin (CRESTOR) 5 MG tablet     Sig: Take 1 tablet by mouth at bedtime.    amLODIPine (NORVASC) 5 MG tablet     Sig: Take 1 tablet (5 mg) by mouth 2 times daily. Together with 2.5 mg for a total of 7.5 mg once daily     Dispense:  60 tablet     Refill:  11     Medications Discontinued During This Encounter   Medication Reason    amLODIPine (NORVASC) 5 MG tablet Reorder (No AVS)         Encounter Diagnoses   Name Primary?    Aneurysm of descending thoracic aorta without rupture     Primary hypertension Yes       CURRENT MEDICATIONS:  Current Outpatient Medications   Medication Sig Dispense Refill    acetaminophen (TYLENOL) 500 MG tablet Take 500 mg by mouth every 6 hours as needed for mild pain.      amLODIPine (NORVASC) 5 MG tablet Take 1 tablet (5 mg) by mouth 2 times daily. Together with 2.5 mg for a total of 7.5 mg once daily 60 tablet 11    AMLODIPINE BESYLATE PO Take 2.5 mg by mouth daily.        Calcium 200 MG TABS Take 3 tablets by mouth daily.      estradiol (ESTRACE) 0.1 MG/GM vaginal cream Place vaginally daily.      LORAZEPAM PO Take 0.5 mg by mouth as needed.        Multiple Vitamins-Minerals (MULTIVITAMIN ADULTS 50+ PO) Take 1 tablet by mouth daily.      rosuvastatin (CRESTOR) 5 MG tablet Take 1 tablet by mouth at bedtime.         ALLERGIES     Allergies   Allergen Reactions    Alendronate Muscle Pain (Myalgia)     Side effect NOT an allergy    Lisinopril Cough     Classic side effect NOT an allergy    Zolpidem Other (See Comments)     Not effective, NOT an allergy       PAST MEDICAL HISTORY:  Past Medical History:   Diagnosis Date    Headaches     Hypertension        PAST SURGICAL  HISTORY:  Past Surgical History:   Procedure Laterality Date     SECTION      ENDOSCOPIC RELEASE CARPAL TUNNEL  2012    Procedure: ENDOSCOPIC RELEASE CARPAL TUNNEL;  LEFT ENDOSCOPIC CARPAL TUNNEL RELEASE;  Surgeon: Chitra James MD;  Location: [facility]    ENDOSCOPIC RELEASE CARPAL TUNNEL  2012    Procedure: ENDOSCOPIC RELEASE CARPAL TUNNEL;  RIGHT ENDOSCOPIC CARPAL TUNNEL RELEASE;  Surgeon: Chitra James MD;  Location: [facility]    SOFT TISSUE SURGERY      lipoma removal       FAMILY HISTORY:  Family History   Problem Relation Age of Onset    Heart Failure Mother        SOCIAL HISTORY:  Social History     Socioeconomic History    Marital status:      Spouse name: None    Number of children: None    Years of education: None    Highest education level: None   Tobacco Use    Smoking status: Never    Smokeless tobacco: Never   Substance and Sexual Activity    Alcohol use: No    Drug use: No     Social Drivers of Health     Financial Resource Strain: Low Risk  (2024)    Received from [facility]    Financial Resource Strain     Difficulty of Paying Living Expenses: 3   Food Insecurity: No Food Insecurity (2024)    Received from [facility]    Food Insecurity     Do you worry your food will run out before you are able to buy more?: 1   Transportation Needs: No Transportation Needs (2024)    Received from [facility]    Transportation Needs     Does lack of transportation keep you from medical appointments?: 1     Does lack of transportation keep you from work, meetings or getting things that you need?: 1   Social Connections: Socially Integrated (2024)    Received from [facility]    Social Connections     Do you often feel lonely or isolated from those around you?: 0   Housing Stability: Low Risk  (2024)    Received from  [facility]    Housing Stability     What is your housing situation today?: 1       Review of Systems:  Skin:        Eyes:       ENT:       Respiratory:  Negative    Cardiovascular:  Negative, palpitations, chest pain, syncope or near-syncope, cyanosis, lightheadedness, dizziness, fatigue, exercise intolerance, edema Positive for  Gastroenterology:      Genitourinary:       Musculoskeletal:       Neurologic:       Psychiatric:       Heme/Lymph/Imm:       Endocrine:         Physical Exam:  Vitals: BP (!) 148/76   Pulse 77   Ht 1.549 m (5' 1\")   Wt 58.2 kg (128 lb 6.4 oz)   BMI 24.26 kg/m      Constitutional:  cooperative, in no acute distress        Skin:  warm and dry to the touch          Head:  normocephalic        Eyes:  pupils equal and round        Lymph:      ENT:  no pallor or cyanosis        Neck:  no carotid bruit        Respiratory:  clear to auscultation         Cardiac: regular rhythm                                                         GI:  abdomen soft        Extremities and Muscular Skeletal:  no edema              Neurological:  affect appropriate, no gross motor deficits        Psych:  Alert and Oriented x 3        Recent Lab Results:  LIPID RESULTS:  No results found for: \"CHOL\", \"HDL\", \"LDL\", \"TRIG\", \"CHOLHDLRATIO\"    LIVER ENZYME RESULTS:  Lab Results   Component Value Date    AST 17 07/16/2022    ALT 19 07/16/2022       CBC RESULTS:  Lab Results   Component Value Date    WBC 8.3 07/16/2022    WBC 11.3 (H) 09/05/2018    RBC 4.90 07/16/2022    RBC 4.62 09/05/2018    HGB 13.0 07/16/2022    HGB 12.4 09/05/2018    HCT 42.7 07/16/2022    HCT 39.0 09/05/2018    MCV 87 07/16/2022    MCV 84 09/05/2018    MCH 26.5 07/16/2022    MCH 26.8 09/05/2018    MCHC 30.4 (L) 07/16/2022    MCHC 31.8 09/05/2018    RDW 14.4 07/16/2022    RDW 14.7 09/05/2018     07/16/2022     09/05/2018       BMP RESULTS:  Lab Results   Component Value Date     07/16/2022    " " 09/05/2018    POTASSIUM 4.2 07/16/2022    POTASSIUM 3.9 09/05/2018    CHLORIDE 105 07/16/2022    CHLORIDE 103 09/05/2018    CO2 27 07/16/2022    CO2 26 09/05/2018    ANIONGAP 6 07/16/2022    ANIONGAP 10 09/05/2018     (H) 07/16/2022     (H) 09/05/2018    BUN 23 07/16/2022    BUN 15 09/05/2018    CR 0.8 07/16/2022    CR 0.71 07/16/2022    CR 0.72 09/05/2018    GFRESTIMATED >60 07/16/2022    GFRESTIMATED 86 07/16/2022    GFRESTIMATED 79 09/05/2018    GFRESTBLACK >90 09/05/2018    MANUEL 9.3 07/16/2022    MANUEL 8.5 09/05/2018        A1C RESULTS:  No results found for: \"A1C\"    INR RESULTS:  Lab Results   Component Value Date    INR 1.00 07/16/2022           CC  Ernie Randall MD  [id number]  [place] [id number]                "

## 2025-01-09 NOTE — TELEPHONE ENCOUNTER
Per OV note:    2.  Hypertension-aggressive control as needed given her complex aortic disease.  She states her blood pressures have been mildly elevated since dealing with this whole issue.  I would recommend increasing her amlodipine for the time being.  She is currently taking 5 mg in the a.m. and 2 point 5 in the PM.  I would recommend taking 5 mg twice daily and I did change her prescription to reflect this.       RN did update prescription  Estela Gordon RN on 1/9/2025 at 12:54 PM

## 2025-01-09 NOTE — LETTER
1/9/2025    Marilin Hobson MD  [address]  [facility] [address]    RE: Jessica Rawls       Dear Colleague,     I had the pleasure of seeing Jessica Rawls in the [facility].  HPI and Plan:   Jessica Rawls is a 79 year old female who presents with for a second opinion related to possible complex redo aortic surgery.  She is a fairly healthy 79-year-old female who underwent ascending aortic replacement in 2013 due to severe aortic aneurysm.  In routine imaging in 2018, she was noted to have aneurysmal dilatation of the proximal aortic arch around 4.6 cm, there is no comment on the descending aorta although the abdominal aorta is read as no aneurysm or dissection.  Again in 2022 she had repeat chest CT which showed ascending thoracic aorta measuring 4.8 cm, atherosclerosis and intraluminal thrombus at the level of the aortic arch and descending thoracic aorta is read as unchanged.  Lastly repeat chest CT in August 2024, the ascending aortic surgical graft is intact and normal size, there is dilatation of the ascending aorta past the surgical graft now measuring 5.0 cm in diameter the aortic arch is dilated maximal diameter 4.9, descending aorta shows layered thrombus in the proximal descending aorta maximal diameter of 5.5 x 6.0 cm.  She initially saw a cardiothoracic surgeon at [facility] Dr. Daniel Zapien who wanted to look at the images himself from the previous chest CTs before making a recommendation according to his note.  The follow-up recommendation is not documented however the patient states that he preferred waiting 6 months and repeating a chest CT rather than pursuing surgery at this time.  She then saw Dr. Randall, cardiothoracic surgeon at [place] who recommended redo operation with circulatory arrest and a hybrid open graft replacement of the aortic arch with frozen elephant trunk with likely extension with a TEVAR to exclude the descending aorta.  She is  having no active symptoms today.  She is mildly hypertensive today with a blood pressure 148/76  We did perform an electrocardiogram in office which demonstrated normal sinus rhythm without acute ST or T wave abnormality    Summary    1.  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  She has had progressive aneurysmal disease of the distal ascending aorta, aortic arch and descending aorta.  The maximal diameter in the descending aorta is around 6.0 cm.  She has sought the opinion of 2 cardiothoracic surgeons, 1 recommending conservative therapy with repeat chest CT in 3 to 6 months, the other recommending complex thoracic aortic surgery.  Not only does the size of the maximal diameter of the aorta matter in regards to risk of rupture but also its progression over time.  I understand why the first thoracic surgeon wanted to evaluate the images himself prior to making decision as the dictation of the previous chest CTs, it is not clear exactly how much the distal ascending aorta, aortic arch and descending aorta have expanded over the last 6 years.  Given the complexity of this surgical repair, I would strongly recommend surgery in a facility with low operative risk and significant previous experience with this type of complex and extensive aortic surgery.  I am recommending referral to the [facility] at this time.    2.  Hypertension-aggressive control as needed given her complex aortic disease.  She states her blood pressures have been mildly elevated since dealing with this whole issue.  I would recommend increasing her amlodipine for the time being.  She is currently taking 5 mg in the a.m. and 2 point 5 in the PM.  I would recommend taking 5 mg twice daily and I did change her prescription to reflect this.    Please feel free to contact me with any questions given regards to her care  Today's clinic visit entailed:  Review of external notes as documented  elsewhere in note  Review of the result(s) of each unique test - chest CT, Cs from 2018,2022  Prescription drug management  Total tme spent in chart review, history, exam, education, clinical recommendations and counseling 40 min  Provider  Link to MDM Help Grid     The level of medical decision making during this visit was of moderate complexity.    Orders Placed This Encounter   Procedures     EKG 12-lead complete w/read - Clinics (performed today)     Orders Placed This Encounter   Medications     rosuvastatin (CRESTOR) 5 MG tablet     Sig: Take 1 tablet by mouth at bedtime.     amLODIPine (NORVASC) 5 MG tablet     Sig: Take 1 tablet (5 mg) by mouth 2 times daily. Together with 2.5 mg for a total of 7.5 mg once daily     Dispense:  60 tablet     Refill:  11     Medications Discontinued During This Encounter   Medication Reason     amLODIPine (NORVASC) 5 MG tablet Reorder (No AVS)         Encounter Diagnoses   Name Primary?     Aneurysm of descending thoracic aorta without rupture      Primary hypertension Yes       CURRENT MEDICATIONS:  Current Outpatient Medications   Medication Sig Dispense Refill     acetaminophen (TYLENOL) 500 MG tablet Take 500 mg by mouth every 6 hours as needed for mild pain.       amLODIPine (NORVASC) 5 MG tablet Take 1 tablet (5 mg) by mouth 2 times daily. Together with 2.5 mg for a total of 7.5 mg once daily 60 tablet 11     AMLODIPINE BESYLATE PO Take 2.5 mg by mouth daily.         Calcium 200 MG TABS Take 3 tablets by mouth daily.       estradiol (ESTRACE) 0.1 MG/GM vaginal cream Place vaginally daily.       LORAZEPAM PO Take 0.5 mg by mouth as needed.         Multiple Vitamins-Minerals (MULTIVITAMIN ADULTS 50+ PO) Take 1 tablet by mouth daily.       rosuvastatin (CRESTOR) 5 MG tablet Take 1 tablet by mouth at bedtime.         ALLERGIES     Allergies   Allergen Reactions     Alendronate Muscle Pain (Myalgia)     Side effect NOT an allergy     Lisinopril Cough     Classic side effect  NOT an allergy     Zolpidem Other (See Comments)     Not effective, NOT an allergy       PAST MEDICAL HISTORY:  Past Medical History:   Diagnosis Date     Headaches      Hypertension        PAST SURGICAL HISTORY:  Past Surgical History:   Procedure Laterality Date      SECTION       ENDOSCOPIC RELEASE CARPAL TUNNEL  2012    Procedure: ENDOSCOPIC RELEASE CARPAL TUNNEL;  LEFT ENDOSCOPIC CARPAL TUNNEL RELEASE;  Surgeon: Chitra James MD;  Location: [facility]     ENDOSCOPIC RELEASE CARPAL TUNNEL  2012    Procedure: ENDOSCOPIC RELEASE CARPAL TUNNEL;  RIGHT ENDOSCOPIC CARPAL TUNNEL RELEASE;  Surgeon: Chitra James MD;  Location: [facility]     SOFT TISSUE SURGERY      lipoma removal       FAMILY HISTORY:  Family History   Problem Relation Age of Onset     Heart Failure Mother        SOCIAL HISTORY:  Social History     Socioeconomic History     Marital status:      Spouse name: None     Number of children: None     Years of education: None     Highest education level: None   Tobacco Use     Smoking status: Never     Smokeless tobacco: Never   Substance and Sexual Activity     Alcohol use: No     Drug use: No     Social Drivers of Health     Financial Resource Strain: Low Risk  (2024)    [email or web address]    Financial Resource Strain      Difficulty of Paying Living Expenses: 3   Food Insecurity: No Food Insecurity (2024)    [email or web address]    Food Insecurity      Do you worry your food will run out before you are able to buy more?: 1   Transportation Needs: No Transportation Needs (2024)    Received from [facility]    Transportation Needs      Does lack of transportation keep you from medical appointments?: 1      Does lack of transportation keep you from work, meetings or getting things that you need?: 1   Social Connections: Socially Integrated  "(11/6/2024)    Received from [employer] [facility]    Social Connections      Do you often feel lonely or isolated from those around you?: 0   Housing Stability: Low Risk  (11/6/2024)    Received from [employer] [facility]    Housing Stability      What is your housing situation today?: 1       Review of Systems:  Skin:        Eyes:       ENT:       Respiratory:  Negative    Cardiovascular:  Negative, palpitations, chest pain, syncope or near-syncope, cyanosis, lightheadedness, dizziness, fatigue, exercise intolerance, edema Positive for  Gastroenterology:      Genitourinary:       Musculoskeletal:       Neurologic:       Psychiatric:       Heme/Lymph/Imm:       Endocrine:         Physical Exam:  Vitals: BP (!) 148/76   Pulse 77   Ht 1.549 m (5' 1\")   Wt 58.2 kg (128 lb 6.4 oz)   BMI 24.26 kg/m      Constitutional:  cooperative, in no acute distress        Skin:  warm and dry to the touch          Head:  normocephalic        Eyes:  pupils equal and round        Lymph:      ENT:  no pallor or cyanosis        Neck:  no carotid bruit        Respiratory:  clear to auscultation         Cardiac: regular rhythm                                                         GI:  abdomen soft        Extremities and Muscular Skeletal:  no edema              Neurological:  affect appropriate, no gross motor deficits        Psych:  Alert and Oriented x 3        Recent Lab Results:  LIPID RESULTS:  No results found for: \"CHOL\", \"HDL\", \"LDL\", \"TRIG\", \"CHOLHDLRATIO\"    LIVER ENZYME RESULTS:  Lab Results   Component Value Date    AST 17 07/16/2022    ALT 19 07/16/2022       CBC RESULTS:  Lab Results   Component Value Date    WBC 8.3 07/16/2022    WBC 11.3 (H) 09/05/2018    RBC 4.90 07/16/2022    RBC 4.62 09/05/2018    HGB 13.0 07/16/2022    HGB 12.4 09/05/2018    HCT 42.7 07/16/2022    HCT 39.0 09/05/2018    MCV 87 07/16/2022    MCV 84 09/05/2018    MCH 26.5 07/16/2022    MCH 26.8 " "09/05/2018    MCHC 30.4 (L) 07/16/2022    MCHC 31.8 09/05/2018    RDW 14.4 07/16/2022    RDW 14.7 09/05/2018     07/16/2022     09/05/2018       BMP RESULTS:  Lab Results   Component Value Date     07/16/2022     09/05/2018    POTASSIUM 4.2 07/16/2022    POTASSIUM 3.9 09/05/2018    CHLORIDE 105 07/16/2022    CHLORIDE 103 09/05/2018    CO2 27 07/16/2022    CO2 26 09/05/2018    ANIONGAP 6 07/16/2022    ANIONGAP 10 09/05/2018     (H) 07/16/2022     (H) 09/05/2018    BUN 23 07/16/2022    BUN 15 09/05/2018    CR 0.8 07/16/2022    CR 0.71 07/16/2022    CR 0.72 09/05/2018    GFRESTIMATED >60 07/16/2022    GFRESTIMATED 86 07/16/2022    GFRESTIMATED 79 09/05/2018    GFRESTBLACK >90 09/05/2018    MANUEL 9.3 07/16/2022    MANUEL 8.5 09/05/2018        A1C RESULTS:  No results found for: \"A1C\"    INR RESULTS:  Lab Results   Component Value Date    INR 1.00 07/16/2022           CC  Ernie Randall MD  [address]  [place]  [place] [address]                  Thank you for allowing me to participate in the care of your patient.      Sincerely,     Balbina Patel, [facility]  cc:   Ernie Randall MD  [address]  [place]  [place] [address]      "

## 2025-01-09 NOTE — TELEPHONE ENCOUNTER
Health Call Center    Phone Message    May a detailed message be left on voicemail: yes     Reason for Call: Medication Question or concern regarding medication   Prescription Clarification  Name of Medication:   amLODIPine (NORVASC) 5 MG tablet [[id number]] (Order [id number])   Prescribing Provider: michael   Pharmacy: [id number] - [place], [place] - [address]     What on the order needs clarification? Pharmacy is calling for clarification on dosage of the above medication. Instructions are unclear as to how frequent and exact dosage wanted are. Please call pharmacy back to discuss.       Action Taken: Other: cardiology     Travel Screening: Not Applicable      Thank you!  [facility]        Date of Service:

## 2025-01-09 NOTE — TELEPHONE ENCOUNTER
[facility] Call Center    Phone Message    May a detailed message be left on voicemail: yes    Reason for Call: Patient called requesting to speak with Dr. Patel in regards to a surgery she recommended. Please call back to further discuss.    Thank you!  [facility]

## 2025-01-10 NOTE — TELEPHONE ENCOUNTER
Patient she has spoken with insurance and would like to move forward with [facility] referral.  RN will start referral process.  Estela Gordon RN on 1/10/2025 at 2:40 PM

## 2025-07-24 ENCOUNTER — OFFICE VISIT (OUTPATIENT)
Dept: CARDIOLOGY | Facility: CLINIC | Age: 80
End: 2025-07-24
Payer: COMMERCIAL

## 2025-07-24 VITALS
DIASTOLIC BLOOD PRESSURE: 74 MMHG | HEART RATE: 64 BPM | BODY MASS INDEX: 22.19 KG/M2 | OXYGEN SATURATION: 97 % | HEIGHT: 61 IN | SYSTOLIC BLOOD PRESSURE: 152 MMHG | WEIGHT: 117.5 LBS

## 2025-07-24 DIAGNOSIS — Z86.79 S/P ENDOVASCULAR ANEURYSM REPAIR: ICD-10-CM

## 2025-07-24 DIAGNOSIS — I71.23 ANEURYSM OF DESCENDING THORACIC AORTA WITHOUT RUPTURE: Primary | ICD-10-CM

## 2025-07-24 DIAGNOSIS — Z98.890 S/P ENDOVASCULAR ANEURYSM REPAIR: ICD-10-CM

## 2025-07-24 DIAGNOSIS — I71.21 ANEURYSM OF ASCENDING AORTA WITHOUT RUPTURE: ICD-10-CM

## 2025-07-24 DIAGNOSIS — Z98.890 S/P ASCENDING AORTIC ANEURYSM REPAIR: ICD-10-CM

## 2025-07-24 DIAGNOSIS — I10 PRIMARY HYPERTENSION: ICD-10-CM

## 2025-07-24 DIAGNOSIS — Z86.79 S/P ASCENDING AORTIC ANEURYSM REPAIR: ICD-10-CM

## 2025-07-24 RX ORDER — AMLODIPINE BESYLATE 2.5 MG/1
2.5 TABLET ORAL DAILY
Qty: 90 TABLET | Refills: 4 | Status: SHIPPED | OUTPATIENT
Start: 2025-07-24

## 2025-07-24 RX ORDER — METOPROLOL TARTRATE 25 MG/1
TABLET, FILM COATED ORAL
COMMUNITY
Start: 2025-04-11 | End: 2025-07-24

## 2025-07-24 NOTE — PATIENT INSTRUCTIONS
It was a pleasure seeing you today and thank you for allowing me to be a part of your health care team.  Please be aware that your follow-up visit may be scheduled with one of the Advanced Practice Providers (Nurse Practitioner or Physician Assistant) on our care team. We are a team and work closely together to meet your health care needs.  Should you have any questions regarding your visit or future needs please feel free to reach out to my care team for assistance.      Thank you, Dr. Lilian Patel        **Nursing: [phone]       **Scheduling: [phone]

## 2025-07-24 NOTE — LETTER
7/24/2025    Marilin Hobson MD  [address]  [facility] [address]    RE: Jessica Rawls       Dear Colleague,     I had the pleasure of seeing Jessica Rawls in the [facility].  HPI and Plan:   Jessica Rawls is a 80 year old female who presents with history of familial aortic aneurysm status post ascending repair in 2013 and status post distal ascending aorta and aortic arch graft repair in April at [facility] and then endovascular repair of the descending aorta in June.  She had no significant complications, coronary arteries were evaluated prior and were considered normal and postop echocardiogram shows normal LV function with mild RV dysfunction.  She is recovering well and getting back into a regular exercise routine.  She does believe she is having some mild side effects with metoprolol which was placed after her surgery.  Interestingly she is only on 6.25 mg of Lopressor or 1/4 tablet twice daily.  This is possibly causing some headache and lightheadedness for her.  She has been monitoring her blood pressure pretty consistently and brought in measurements which range anywhere in the 1 teens to 130s systolic.  The vascular surgeon is recommending that her blood pressure be kept well-controlled less than 130 systolic.  Lastly she has been having some easy bruising from taking baby aspirin postop.  Given her extensive graft repair I do feel she needs to stay on this.    On exam her incision site appears well-healed, rhythm is regular without murmur gallop or rub, lungs are clear and she has no peripheral edema  I reviewed her last echocardiogram in April which showed EF of 60% mild AI and mild pulmonary hypertension    Summary    1.  Familial aortic aneurysm-remote repair of her ascending aorta in 2013, recent repair of the distal ascending and aortic arch in April and endovascular repair of the descending aorta in June.  She is doing well from a cardiac perspective.    2.   Hypertension-on a very low-dose of Lopressor which is causing side effects.  She had been on amlodipine in the past, without any side effects therefore I am recommending to stop Lopressor and go back to taking amlodipine 2.5 mg daily.  She will continue to monitor blood pressures closely and contact me if they are not within the goal range of 0540-7188 systolic    3.  Endovascular repair of the descending aorta-she has follow-up with [facility] 1 more time, then we will likely refer her to a local vascular surgeon for continued follow-up.    Please feel free to contact me with any questions you have in regards to her care       Today's clinic visit entailed:  Review of external notes as documented elsewhere in note  Review of the result(s) of each unique test - echo  Prescription drug management    Provider  Link to [facility] Help Grid     The level of medical decision making during this visit was of moderate complexity.    No orders of the defined types were placed in this encounter.    Orders Placed This Encounter   Medications     Aspirin 81 MG CAPS     Sig: Take by mouth.     DISCONTD: metoprolol tartrate (LOPRESSOR) 25 MG tablet     Sig: Oral; Duration: 30 Days     amLODIPine (NORVASC) 2.5 MG tablet     Sig: Take 1 tablet (2.5 mg) by mouth daily.     Dispense:  90 tablet     Refill:  4     DO NOT FILL UNTIL REQUESTED.     Medications Discontinued During This Encounter   Medication Reason     amLODIPine (NORVASC) 5 MG tablet Stopped by Patient (No AVS)     AMLODIPINE BESYLATE PO Stopped by Patient (No AVS)     metoprolol tartrate (LOPRESSOR) 25 MG tablet          Encounter Diagnoses   Name Primary?     Aneurysm of descending thoracic aorta without rupture Yes     Aneurysm of ascending aorta without rupture      S/P endovascular aneurysm repair      S/P ascending aortic aneurysm repair      Primary hypertension        CURRENT MEDICATIONS:  Current Outpatient Medications   Medication Sig Dispense Refill      acetaminophen (TYLENOL) 500 MG tablet Take 500 mg by mouth every 6 hours as needed for mild pain.       amLODIPine (NORVASC) 2.5 MG tablet Take 1 tablet (2.5 mg) by mouth daily. 90 tablet 4     Calcium 200 MG TABS Take 3 tablets by mouth daily.       estradiol (ESTRACE) 0.1 MG/GM vaginal cream Place vaginally daily.       LORAZEPAM PO Take 0.5 mg by mouth as needed.         Multiple Vitamins-Minerals (MULTIVITAMIN ADULTS 50+ PO) Take 1 tablet by mouth daily.       rosuvastatin (CRESTOR) 5 MG tablet Take 1 tablet by mouth at bedtime.       Aspirin 81 MG CAPS Take by mouth.         ALLERGIES     Allergies   Allergen Reactions     Alendronate Muscle Pain (Myalgia)     Side effect NOT an allergy     Lisinopril Cough     Classic side effect NOT an allergy     Zolpidem Other (See Comments)     Not effective, NOT an allergy       PAST MEDICAL HISTORY:  Past Medical History:   Diagnosis Date     Headaches      Hypertension        PAST SURGICAL HISTORY:  Past Surgical History:   Procedure Laterality Date      SECTION       ENDOSCOPIC RELEASE CARPAL TUNNEL  2012    Procedure: ENDOSCOPIC RELEASE CARPAL TUNNEL;  LEFT ENDOSCOPIC CARPAL TUNNEL RELEASE;  Surgeon: Chitra James MD;  Location: [facility]     ENDOSCOPIC RELEASE CARPAL TUNNEL  2012    Procedure: ENDOSCOPIC RELEASE CARPAL TUNNEL;  RIGHT ENDOSCOPIC CARPAL TUNNEL RELEASE;  Surgeon: Chitra James MD;  Location: [facility]     SOFT TISSUE SURGERY      lipoma removal       FAMILY HISTORY:  Family History   Problem Relation Age of Onset     Heart Failure Mother        SOCIAL HISTORY:  Social History     Socioeconomic History     Marital status:      Spouse name: None     Number of children: None     Years of education: None     Highest education level: None   Tobacco Use     Smoking status: Never     Smokeless tobacco: Never   Substance and Sexual Activity     Alcohol use: No     Drug use: No     Social Drivers of Health      Financial Resource Strain: Low Risk  (11/6/2024)    Received from [facility]    Financial Resource Strain      Difficulty of Paying Living Expenses: 3   Food Insecurity: No Food Insecurity (6/2/2025)    Received from [facility]    Hunger Vital Sign      Worried About Running Out of Food in the Last Year: Never true      Ran Out of Food in the Last Year: Never true   Transportation Needs: No Transportation Needs (6/2/2025)    Received from [facility]    PRAPARE - Transportation      Lack of Transportation (Medical): No      Lack of Transportation (Non-Medical): No   Physical Activity: Insufficiently Active (2/6/2025)    Received from [facility]    Exercise Vital Sign      Days of Exercise per Week: 5 days      Minutes of Exercise per Session: 20 min   Social Connections: Socially Integrated (11/6/2024)    [facility] from [facility] [employer] [facility]    Social Connections      Do you often feel lonely or isolated from those around you?: 0   Interpersonal Safety: Not At Risk (6/2/2025)    Received from [facility]    Humiliation, Afraid, Rape, and Kick questionnaire      Fear of Current or Ex-Partner: No      Emotionally Abused: No      Physically Abused: No      Sexually Abused: No   Housing Stability: Low Risk  (6/2/2025)    Received from [facility]    Housing Stability      What is your living situation today?: I have a steady place to live       Review of Systems:  Skin:  Positive for hair changes   Eyes:       ENT:  Positive for persistent sore throat  Respiratory:  Negative    Cardiovascular:  Negative, palpitations, fatigue, cyanosis, syncope or near-syncope, lightheadedness, edema Positive for, lightheadedness  Gastroenterology: Positive for nausea  Genitourinary:       Musculoskeletal:  Positive for    Neurologic:    numbness or tingling of hands  Psychiatric:       Heme/Lymph/Imm:       Endocrine:         Physical Exam:  Vitals: BP (!) 152/74   Pulse 64  "  Ht 1.549 m (5' 1\")   Wt 53.3 kg (117 lb 8 oz)   SpO2 97%   BMI 22.20 kg/m      Constitutional:  cooperative, in no acute distress        Skin:  warm and dry to the touch          Head:  normocephalic        Eyes:  pupils equal and round        Lymph:      ENT:  no pallor or cyanosis        Neck:  no carotid bruit        Respiratory:  clear to auscultation         Cardiac: regular rhythm                                                         GI:  abdomen soft        Extremities and Muscular Skeletal:  no edema              Neurological:  affect appropriate, no gross motor deficits        Psych:  Alert and Oriented x 3        Recent Lab Results:  LIPID RESULTS:  No results found for: \"CHOL\", \"HDL\", \"LDL\", \"TRIG\", \"CHOLHDLRATIO\"    LIVER ENZYME RESULTS:  Lab Results   Component Value Date    AST 17 07/16/2022    ALT 19 07/16/2022       CBC RESULTS:  Lab Results   Component Value Date    WBC 8.3 07/16/2022    WBC 11.3 (H) 09/05/2018    RBC 4.90 07/16/2022    RBC 4.62 09/05/2018    HGB 13.0 07/16/2022    HGB 12.4 09/05/2018    HCT 42.7 07/16/2022    HCT 39.0 09/05/2018    MCV 87 07/16/2022    MCV 84 09/05/2018    MCH 26.5 07/16/2022    MCH 26.8 09/05/2018    MCHC 30.4 (L) 07/16/2022    MCHC 31.8 09/05/2018    RDW 14.4 07/16/2022    RDW 14.7 09/05/2018     07/16/2022     09/05/2018       BMP RESULTS:  Lab Results   Component Value Date     07/16/2022     09/05/2018    POTASSIUM 4.2 07/16/2022    POTASSIUM 3.9 09/05/2018    CHLORIDE 105 07/16/2022    CHLORIDE 103 09/05/2018    CO2 27 07/16/2022    CO2 26 09/05/2018    ANIONGAP 6 07/16/2022    ANIONGAP 10 09/05/2018     (H) 07/16/2022     (H) 09/05/2018    BUN 23 07/16/2022    BUN 15 09/05/2018    CR 0.8 07/16/2022    CR 0.71 07/16/2022    CR 0.72 09/05/2018    GFRESTIMATED >60 07/16/2022    GFRESTIMATED 86 07/16/2022    GFRESTIMATED 79 09/05/2018    GFRESTBLACK >90 09/05/2018    MANUEL 9.3 07/16/2022    MANUEL 8.5 09/05/2018    " "    A1C RESULTS:  No results found for: \"A1C\"    INR RESULTS:  Lab Results   Component Value Date    INR 1.00 07/16/2022           CC  Referred Self, MD  No address on file                  Thank you for allowing me to participate in the care of your patient.      Sincerely,     Balbina Patel DO     [facility]  cc:   Referred MD Neal  No address on file      "

## 2025-07-24 NOTE — PROGRESS NOTES
HPI and Plan:   Jessica Rawls is a 80 year old female who presents with history of familial aortic aneurysm status post ascending repair in 2013 and status post distal ascending aorta and aortic arch graft repair in April at [facility] and then endovascular repair of the descending aorta in June.  She had no significant complications, coronary arteries were evaluated prior and were considered normal and postop echocardiogram shows normal LV function with mild RV dysfunction.  She is recovering well and getting back into a regular exercise routine.  She does believe she is having some mild side effects with metoprolol which was placed after her surgery.  Interestingly she is only on 6.25 mg of Lopressor or 1/4 tablet twice daily.  This is possibly causing some headache and lightheadedness for her.  She has been monitoring her blood pressure pretty consistently and brought in measurements which range anywhere in the 1 teens to 130s systolic.  The vascular surgeon is recommending that her blood pressure be kept well-controlled less than 130 systolic.  Lastly she has been having some easy bruising from taking baby aspirin postop.  Given her extensive graft repair I do feel she needs to stay on this.    On exam her incision site appears well-healed, rhythm is regular without murmur gallop or rub, lungs are clear and she has no peripheral edema  I reviewed her last echocardiogram in April which showed EF of 60% mild AI and mild pulmonary hypertension    Summary    1.  Familial aortic aneurysm-remote repair of her ascending aorta in 2013, recent repair of the distal ascending and aortic arch in April and endovascular repair of the descending aorta in June.  She is doing well from a cardiac perspective.    2.  Hypertension-on a very low-dose of Lopressor which is causing side effects.  She had been on amlodipine in the past, without any side effects therefore I am recommending to stop Lopressor and go back to taking  amlodipine 2.5 mg daily.  She will continue to monitor blood pressures closely and contact me if they are not within the goal range of [id number] systolic    3.  Endovascular repair of the descending aorta-she has follow-up with [facility] 1 more time, then we will likely refer her to a local vascular surgeon for continued follow-up.    Please feel free to contact me with any questions you have in regards to her care       Today's clinic visit entailed:  Review of external notes as documented elsewhere in note  Review of the result(s) of each unique test - echo  Prescription drug management    Provider  Link to [facility] Help Grid     The level of medical decision making during this visit was of moderate complexity.    No orders of the defined types were placed in this encounter.    Orders Placed This Encounter   Medications    Aspirin 81 MG CAPS     Sig: Take by mouth.    DISCONTD: metoprolol tartrate (LOPRESSOR) 25 MG tablet     Sig: Oral; Duration: 30 Days    amLODIPine (NORVASC) 2.5 MG tablet     Sig: Take 1 tablet (2.5 mg) by mouth daily.     Dispense:  90 tablet     Refill:  4     DO NOT FILL UNTIL REQUESTED.     Medications Discontinued During This Encounter   Medication Reason    amLODIPine (NORVASC) 5 MG tablet Stopped by Patient (No AVS)    AMLODIPINE BESYLATE PO Stopped by Patient (No AVS)    metoprolol tartrate (LOPRESSOR) 25 MG tablet          Encounter Diagnoses   Name Primary?    Aneurysm of descending thoracic aorta without rupture Yes    Aneurysm of ascending aorta without rupture     S/P endovascular aneurysm repair     S/P ascending aortic aneurysm repair     Primary hypertension        CURRENT MEDICATIONS:  Current Outpatient Medications   Medication Sig Dispense Refill    acetaminophen (TYLENOL) 500 MG tablet Take 500 mg by mouth every 6 hours as needed for mild pain.      amLODIPine (NORVASC) 2.5 MG tablet Take 1 tablet (2.5 mg) by mouth daily. 90 tablet 4    Calcium 200 MG TABS Take 3 tablets by  mouth daily.      estradiol (ESTRACE) 0.1 MG/GM vaginal cream Place vaginally daily.      LORAZEPAM PO Take 0.5 mg by mouth as needed.        Multiple Vitamins-Minerals (MULTIVITAMIN ADULTS 50+ PO) Take 1 tablet by mouth daily.      rosuvastatin (CRESTOR) 5 MG tablet Take 1 tablet by mouth at bedtime.      Aspirin 81 MG CAPS Take by mouth.         ALLERGIES     Allergies   Allergen Reactions    Alendronate Muscle Pain (Myalgia)     Side effect NOT an allergy    Lisinopril Cough     Classic side effect NOT an allergy    Zolpidem Other (See Comments)     Not effective, NOT an allergy       PAST MEDICAL HISTORY:  Past Medical History:   Diagnosis Date    Headaches     Hypertension        PAST SURGICAL HISTORY:  Past Surgical History:   Procedure Laterality Date     SECTION      ENDOSCOPIC RELEASE CARPAL TUNNEL  2012    Procedure: ENDOSCOPIC RELEASE CARPAL TUNNEL;  LEFT ENDOSCOPIC CARPAL TUNNEL RELEASE;  Surgeon: Chitra James MD;  Location: [facility]    ENDOSCOPIC RELEASE CARPAL TUNNEL  2012    Procedure: ENDOSCOPIC RELEASE CARPAL TUNNEL;  RIGHT ENDOSCOPIC CARPAL TUNNEL RELEASE;  Surgeon: Chitra James MD;  Location: [facility]    SOFT TISSUE SURGERY      lipoma removal       FAMILY HISTORY:  Family History   Problem Relation Age of Onset    Heart Failure Mother        SOCIAL HISTORY:  Social History     Socioeconomic History    Marital status:      Spouse name: None    Number of children: None    Years of education: None    Highest education level: None   Tobacco Use    Smoking status: Never    Smokeless tobacco: Never   Substance and Sexual Activity    Alcohol use: No    Drug use: No     Social Drivers of Health     Financial Resource Strain: Low Risk  (2024)    Received from [facility]    Financial Resource Strain     Difficulty of Paying Living Expenses: 3   Food Insecurity: No Food Insecurity (2025)    Received from [facility]  "   Hunger Vital Sign     Worried About Running Out of Food in the Last Year: Never true     Ran Out of Food in the Last Year: Never true   Transportation Needs: No Transportation Needs (6/2/2025)    Received from [facility]    PRAPARE - Transportation     Lack of Transportation (Medical): No     Lack of Transportation (Non-Medical): No   Physical Activity: Insufficiently Active (2/6/2025)    Received from [facility]    Exercise Vital Sign     Days of Exercise per Week: 5 days     Minutes of Exercise per Session: 20 min   Social Connections: Socially Integrated (11/6/2024)    Received from [employer] [facility] [employer] [facility] [employer]    Social Connections     Do you often feel lonely or isolated from those around you?: 0   Interpersonal Safety: Not At Risk (6/2/2025)    Received from [facility]    Humiliation, Afraid, Rape, and Kick questionnaire     Fear of Current or Ex-Partner: No     Emotionally Abused: No     Physically Abused: No     Sexually Abused: No   Housing Stability: Low Risk  (6/2/2025)    Received from [facility]    Housing Stability     What is your living situation today?: I have a steady place to live       Review of Systems:  Skin:  Positive for hair changes   Eyes:       ENT:  Positive for persistent sore throat  Respiratory:  Negative    Cardiovascular:  Negative, palpitations, fatigue, cyanosis, syncope or near-syncope, lightheadedness, edema Positive for, lightheadedness  Gastroenterology: Positive for nausea  Genitourinary:       Musculoskeletal:  Positive for    Neurologic:    numbness or tingling of hands  Psychiatric:       Heme/Lymph/Imm:       Endocrine:         Physical Exam:  Vitals: BP (!) 152/74   Pulse 64   Ht 1.549 m (5' 1\")   Wt 53.3 kg (117 lb 8 oz)   SpO2 97%   BMI 22.20 kg/m      Constitutional:  cooperative, in no acute distress        Skin:  warm and dry to the touch          Head:  normocephalic        Eyes:  pupils equal and round        Lymph:      ENT:  no pallor " "or cyanosis        Neck:  no carotid bruit        Respiratory:  clear to auscultation         Cardiac: regular rhythm                                                         GI:  abdomen soft        Extremities and Muscular Skeletal:  no edema              Neurological:  affect appropriate, no gross motor deficits        Psych:  Alert and Oriented x 3        Recent Lab Results:  LIPID RESULTS:  No results found for: \"CHOL\", \"HDL\", \"LDL\", \"TRIG\", \"CHOLHDLRATIO\"    LIVER ENZYME RESULTS:  Lab Results   Component Value Date    AST 17 07/16/2022    ALT 19 07/16/2022       CBC RESULTS:  Lab Results   Component Value Date    WBC 8.3 07/16/2022    WBC 11.3 (H) 09/05/2018    RBC 4.90 07/16/2022    RBC 4.62 09/05/2018    HGB 13.0 07/16/2022    HGB 12.4 09/05/2018    HCT 42.7 07/16/2022    HCT 39.0 09/05/2018    MCV 87 07/16/2022    MCV 84 09/05/2018    MCH 26.5 07/16/2022    MCH 26.8 09/05/2018    MCHC 30.4 (L) 07/16/2022    MCHC 31.8 09/05/2018    RDW 14.4 07/16/2022    RDW 14.7 09/05/2018     07/16/2022     09/05/2018       BMP RESULTS:  Lab Results   Component Value Date     07/16/2022     09/05/2018    POTASSIUM 4.2 07/16/2022    POTASSIUM 3.9 09/05/2018    CHLORIDE 105 07/16/2022    CHLORIDE 103 09/05/2018    CO2 27 07/16/2022    CO2 26 09/05/2018    ANIONGAP 6 07/16/2022    ANIONGAP 10 09/05/2018     (H) 07/16/2022     (H) 09/05/2018    BUN 23 07/16/2022    BUN 15 09/05/2018    CR 0.8 07/16/2022    CR 0.71 07/16/2022    CR 0.72 09/05/2018    GFRESTIMATED >60 07/16/2022    GFRESTIMATED 86 07/16/2022    GFRESTIMATED 79 09/05/2018    GFRESTBLACK >90 09/05/2018    MANUEL 9.3 07/16/2022    MANUEL 8.5 09/05/2018        A1C RESULTS:  No results found for: \"A1C\"    INR RESULTS:  Lab Results   Component Value Date    INR 1.00 07/16/2022           CC  Referred Self, MD  No address on file                "